# Patient Record
Sex: FEMALE | Race: WHITE | Employment: UNEMPLOYED | URBAN - METROPOLITAN AREA
[De-identification: names, ages, dates, MRNs, and addresses within clinical notes are randomized per-mention and may not be internally consistent; named-entity substitution may affect disease eponyms.]

---

## 2017-08-15 ENCOUNTER — HOSPITAL ENCOUNTER (EMERGENCY)
Age: 62
Discharge: HOME | End: 2017-08-15
Attending: EMERGENCY MEDICINE | Admitting: EMERGENCY MEDICINE
Payer: MEDICARE

## 2017-08-15 VITALS
SYSTOLIC BLOOD PRESSURE: 139 MMHG | WEIGHT: 169.6 LBS | OXYGEN SATURATION: 94 % | DIASTOLIC BLOOD PRESSURE: 89 MMHG | TEMPERATURE: 98.4 F | HEART RATE: 80 BPM | BODY MASS INDEX: 28.95 KG/M2 | HEIGHT: 64 IN | RESPIRATION RATE: 16 BRPM

## 2017-08-15 DIAGNOSIS — G89.29 OTHER CHRONIC PAIN: Primary | ICD-10-CM

## 2017-08-15 PROCEDURE — 96372 THER/PROPH/DIAG INJ SC/IM: CPT

## 2017-08-15 PROCEDURE — 99282 EMERGENCY DEPT VISIT SF MDM: CPT

## 2017-08-15 PROCEDURE — 74011250636 HC RX REV CODE- 250/636: Performed by: EMERGENCY MEDICINE

## 2017-08-15 RX ORDER — KETOROLAC TROMETHAMINE 30 MG/ML
60 INJECTION, SOLUTION INTRAMUSCULAR; INTRAVENOUS
Status: COMPLETED | OUTPATIENT
Start: 2017-08-15 | End: 2017-08-15

## 2017-08-15 RX ADMIN — KETOROLAC TROMETHAMINE 60 MG: 30 INJECTION, SOLUTION INTRAMUSCULAR at 10:08

## 2017-08-15 NOTE — ED TRIAGE NOTES
Triage: Pt c/o lower back pain for several years, pt states she has a compression fx recently discovered, pt has being seen by PCP who was also managing pain management regimen, PCP has recently cut all pain services.  Pt looking for pain specialist referral.

## 2017-08-15 NOTE — DISCHARGE INSTRUCTIONS
We hope that we have addressed all of your medical concerns. The examination and treatment you received in the Emergency Department were for an emergent problem and were not intended as complete care. It is important that you follow up with your healthcare provider(s) for ongoing care. If your symptoms worsen or do not improve as expected, and you are unable to reach your usual health care provider(s), you should return to the Emergency Department. Today's healthcare is undergoing tremendous change, and patient satisfaction surveys are one of the many tools to assess the quality of medical care. You may receive a survey from the [employer] regarding your experience in the Emergency Department. I hope that your experience has been completely positive, particularly the medical care that I provided. As such, please participate in the survey; anything less than excellent does not meet my expectations or intentions. [facility] and [address] [place] [address] participate in nationally recognized quality of care measures. If your blood pressure is greater than 120/80, as reported below, we urge that you seek medical care to address the potential of high blood pressure, commonly known as hypertension. Hypertension can be hereditary or can be caused by certain medical conditions, pain, stress, or \"white coat syndrome. \"       Please make an appointment with your health care provider(s) for follow up of your Emergency Department visit. VITALS:   Patient Vitals for the past 8 hrs:   Temp Pulse Resp BP SpO2   08/15/17 0938 98.4 °F (36.9 °C) 80 16 139/89 94 %          Thank you for allowing us to provide you with medical care today. We realize that you have many choices for your emergency care needs. Please choose us in the future for any continued health care needs.       Jannet Joyce NP    [facility].   Office: [phone]            No results found for this or any previous visit (from the past 24 hour(s)). No results found. Chronic Pain: Care Instructions  Your Care Instructions  Chronic pain is pain that lasts a long time (months or even years) and may or may not have a clear cause. It is different from acute pain, which usually does have a clear cause--like an injury or illness--and gets better over time. Chronic pain:  · Lasts over time but may vary from day to day. · Does not go away despite efforts to end it. · May disrupt your sleep and lead to fatigue. · May cause depression or anxiety. · May make your muscles tense, causing more pain. · Can disrupt your work, hobbies, home life, and relationships with friends and family. Chronic pain is a very real condition. It is not just in your head. Treatment can help and usually includes several methods used together, such as medicines, physical therapy, exercise, and other treatments. Learning how to relax and changing negative thought patterns can also help you cope. Chronic pain is complex. Taking an active role in your treatment will help you better manage your pain. Tell your doctor if you have trouble dealing with your pain. You may have to try several things before you find what works best for you. Follow-up care is a key part of your treatment and safety. Be sure to make and go to all appointments, and call your doctor if you are having problems. Its also a good idea to know your test results and keep a list of the medicines you take. How can you care for yourself at home? · Pace yourself. Break up large jobs into smaller tasks. Save harder tasks for days when you have less pain, or go back and forth between hard tasks and easier ones. Take rest breaks. · Relax, and reduce stress. Relaxation techniques such as deep breathing or meditation can help. · Keep moving. Gentle, daily exercise can help reduce pain over the long run.  Try low- or no-impact exercises such as walking, swimming, and stationary biking. Do stretches to stay flexible. · Try heat, cold packs, and massage. · Get enough sleep. Chronic pain can make you tired and drain your energy. Talk with your doctor if you have trouble sleeping because of pain. · Think positive. Your thoughts can affect your pain level. Do things that you enjoy to distract yourself when you have pain instead of focusing on the pain. See a movie, read a book, listen to music, or spend time with a friend. · If you think you are depressed, talk to your doctor about treatment. · Keep a daily pain diary. Record how your moods, thoughts, sleep patterns, activities, and medicine affect your pain. You may find that your pain is worse during or after certain activities or when you are feeling a certain emotion. Having a record of your pain can help you and your doctor find the best ways to treat your pain. · Take pain medicines exactly as directed. ¨ If the doctor gave you a prescription medicine for pain, take it as prescribed. ¨ If you are not taking a prescription pain medicine, ask your doctor if you can take an over-the-counter medicine. Reducing constipation caused by pain medicine  · Include fruits, vegetables, beans, and whole grains in your diet each day. These foods are high in fiber. · Drink plenty of fluids, enough so that your urine is light yellow or clear like water. If you have kidney, heart, or liver disease and have to limit fluids, talk with your doctor before you increase the amount of fluids you drink. · If your doctor recommends it, get more exercise. Walking is a good choice. Bit by bit, increase the amount you walk every day. Try for at least 30 minutes on most days of the week. · Schedule time each day for a bowel movement. A daily routine may help. Take your time and do not strain when having a bowel movement. When should you call for help?   Call your doctor now or seek immediate medical care if:  · Your pain gets worse or is out of control. · You feel down or blue, or you do not enjoy things like you once did. You may be depressed, which is common in people with chronic pain. Depression can be treated. · You have vomiting or cramps for more than 2 hours. Watch closely for changes in your health, and be sure to contact your doctor if:  · You cannot sleep because of pain. · You are very worried or anxious about your pain. · You have trouble taking your pain medicine. · You have any concerns about your pain medicine. · You have trouble with bowel movements, such as:  ¨ No bowel movement in 3 days. ¨ Blood in the anal area, in your stool, or on the toilet paper. ¨ Diarrhea for more than 24 hours. Where can you learn more? Go to http://timi-cristhian.info/. Enter N004 in the search box to learn more about \"Chronic Pain: Care Instructions. \"  Current as of: October 14, 2016  Content Version: 11.3  © 6025-5102 [employer]. Care instructions adapted under license by [employer] (which disclaims liability or warranty for this information). If you have questions about a medical condition or this instruction, always ask your healthcare professional. [place] [address] any warranty or liability for your use of this information. Learning About Prescribed Opioid Medicine for Chronic Pain  Introduction  Opioids are medicines used to relieve moderate to severe pain. They may be used for pain that may go on for a long time, such as for cancer or arthritis. Opioids relieve pain by changing the way your body feels pain and the way you feel about pain. They don't cure a health problem. But they do help you manage the pain. Your doctor will talk with you about how they fit into your overall treatment plan. Doctors follow a strict set of rules for giving opioids to their patients who have long-term (chronic) pain.  This is because these are powerful medicines. They can cause problems if they are not used correctly. They can also be misused. Getting a prescription for an opioid may seem hard to do. You may feel like your doctor doesn't trust you. Your doctor understands this. But the rules are the same for everyone. They help make sure that the medicine will be used safely. What happens before you get a prescription? Your doctor may talk with you about your pain history. He or she may ask about your family history. You may have a physical exam. You may also see a pain specialist. This helps your doctor decide if an opioid is right for you. Your doctor will also review your history of opioid use. Most states have a program that tracks prescriptions. If your state does, your doctor will check it to see if you've had prescriptions for opioids before. You may have a urine test to check for opioids in your system. If you're a woman, you may have a pregnancy test. Opioids are not safe to take if you are pregnant. When your doctor prescribes the medicine, he or she will discuss your treatment plan with you. That includes the risks and benefits of opioid medicines. Your doctor will tell you how much pain relief and improved ability to function you can expect. The goal is to take the lowest dose that improves your pain for the shortest amount of time. Your doctor will also talk with you about other things you can do to help relieve pain. Your doctor may suggest different medicines or other options, such as steroid injections, ice or heat, physical therapy, or ways to reduce stress. You and your doctor will talk about your responsibilities. You'll both sign a written agreement. You will agree to take your medicine exactly as your doctor tells you to. Ayesha Andrade also agree to be careful with it and not to share it with others. What happens after you start to use your medicine?   Regular follow-up visits to your doctor will help you and your doctor make sure that the medicine is the right treatment for your pain. At every visit, your doctor will check these things:  · Is your pain being controlled? · Are you better able to function and be active? · Are you having any side effects, like constipation, nausea, or being too sleepy? · Are the non-opioid pain control measures working? Are there other things you can try? · Are there any signs that you are misusing your medicine? While you are taking an opioid, you may have drug tests and prescription history checks from time to time. Follow-up care is a key part of your treatment and safety. Be sure to make and go to all appointments, and call your doctor if you are having problems. It's also a good idea to know your test results and keep a list of the medicines you take. Where can you learn more? Go to http://timi-cristhian.info/. Enter D374 in the search box to learn more about \"Learning About Prescribed Opioid Medicine for Chronic Pain. \"  Current as of: November 15, 2016  Content Version: 11.3  © 9031-2375 [employer]. Care instructions adapted under license by [employer] (which disclaims liability or warranty for this information). If you have questions about a medical condition or this instruction, always ask your healthcare professional. [address] any warranty or liability for your use of this information. Developing a Pain Management Plan: Care Instructions  Your Care Instructions  Some diseases and injuries can cause pain that lasts a long time. You don't need to live with uncontrolled pain. A pain management plan helps you find ways to control pain with side effects you can live with. There are things you can do to help with pain. Only you know how much pain you feel. Constant pain can make you depressed. It can cause stress and make it hard for you to eat and sleep. But there are ways to control the pain.  They can help you stay active, improve your mood, and heal faster. Your plan can include many types of pain control. You may take prescription or over-the-counter drugs. You can also try physical treatments and behavioral methods. Some medical treatments also help with pain. For example, radiation can be used to reduce pain from bone cancer. You and your doctor will work to make your plan. Be sure to read it often. Change it if your pain is not under control. Follow-up care is a key part of your treatment and safety. Be sure to make and go to all appointments, and call your doctor if you are having problems. It's also a good idea to know your test results and keep a list of the medicines you take. How can you care for yourself at home? Physical treatments  · Be safe with medicines. Take pain medicines exactly as directed. ¨ If the doctor gave you a prescription medicine for pain, take it exactly as prescribed. ¨ If you are not taking a prescription pain medicine, ask your doctor if you can take an over-the-counter medicine. · If your pain medicine causes side effects such as constipation or nausea, you may need to take other medicines for those problems. Talk to your doctor about any side effects you have. · Hot or cold compresses applied directly to the skin can help sore muscles. Put ice or a cold pack on the painful area for 10 to 20 minutes at a time. Put a thin cloth between the ice and your skin. You may find that it helps to switch between cold and heat. Try a heating pad set on low or a warm cloth on the area for 10 to 15 minutes at a time. · Hydrotherapy uses flowing water to relax muscles. You may want to sit in a hot tub or a steam bath. Or use a shower or a sitz bath to help your pain. · Massage therapy is rubbing the soft tissues of the body. It eases tension and pain. It also improves blood flow and helps you relax.   · Transcutaneous electrical nerve stimulation (TENS) uses a gentle electric current applied to the skin for pain relief. You can use TENS at home after you learn how. · Acupuncture is a form of traditional [facility] medicine. It uses very thin needles inserted into certain points of the body. It is done by a person with special training (acupuncturist). · If you get physical therapy, make sure to do any home exercises or stretching your therapist has prescribed. · Stay as active as you can. Try to get some physical activity every day. Behavioral treatments  · Biofeedback teaches you to control a body function that you normally don't think about. These are things like skin temperature, heart rate, and blood pressure. At first, you will use a machine to give you feedback on the function you want to control. Then a therapist will teach you what to do next. Over time, you can stop using the machine. · Breathing techniques can help you relax and get rid of tension. · Guided imagery is a series of thoughts and images that can focus your attention away from your pain. When you do it, you use all your senses to help your body respond as though what you are imagining is real.  · Hypnosis is a state of focused concentration that makes you less aware of your surroundings. It may cause your brain to release chemicals that relieve pain. You can have a therapist help you through hypnosis. Or you can learn to use it on yourself. · Cognitive behavioral therapy is a type of counseling. It helps you change your thought patterns. Changes in your thoughts can help change your behavior and the way you perceive your body. Other treatments and ideas  · Aromatherapy uses the scent of oils obtained from plants to help you relax or to relieve stress. · Meditation focuses your attention to give a clear awareness of your life. You sit quietly, focus on one image or sound, and breathe deeply. · Yoga uses stretching and exercises (called postures) to reduce stress and improve flexibility and health.   · Keep track of your pain in a pain diary. This can help you understand how the things you do affect your pain. · In rare cases, your doctor may advise you to get a nerve block to help with pain. A nerve block is a shot of medicine to interrupt pain signals to your brain. · Some hospitals have special pain clinics or centers. Your doctor may refer you to a pain clinic or center. Or you can ask your doctor about them. Where can you learn more? Go to http://timi-cristhian.info/. Enter H796 in the search box to learn more about \"Developing a Pain Management Plan: Care Instructions. \"  Current as of: October 14, 2016  Content Version: 11.3  © 4079-4762 [employer] [facility]. Care instructions adapted under license by [employer] (which disclaims liability or warranty for this information). If you have questions about a medical condition or this instruction, always ask your healthcare professional. [address] any warranty or liability for your use of this information.

## 2017-08-15 NOTE — ED PROVIDER NOTES
Patient is a 57 y.o. female presenting with back pain. Back Pain    Pertinent negatives include no headaches, no abdominal pain and no dysuria. Pt states that she suffers from chronic pain and was released from the care of her pain management specialist (Dr. Kenisha Ewing) 1 month ago. She states that she took her last pain pill yesterday and is requesting pain medication. She states that she suffers from chronic back pain secondary to multiple old compression fractures. Fibromyalgia and arthritis. She denies any new injuries, falls or direct trauma. Denies fever, cold symptoms, headache, neck pain, visual changes, focal weakness, unexplained weight changes or rash. Denies any difficulty breathing, difficulty swallowing, SOB or chest pain. Denies any nausea, vomiting or diarrhea. Pt. Reports that she has a new PCP Dr. Hugh Wynn but has not asked her to manage her pain. Old charts reviewed. Past Medical History:   Diagnosis Date    Arthritis     Bulging disc     COPD     Fibromyalgia     GERD (gastroesophageal reflux disease)     Hypercholesteremia     Hypothyroidism     Osteopenia     Other ill-defined conditions     fibromyalgia  osteopenia    Psychiatric disorder     Thyroid disease     hypothyroidism    Unspecified sleep apnea        Past Surgical History:   Procedure Laterality Date    HX GYN           History reviewed. No pertinent family history. Social History     Social History    Marital status:      Spouse name: N/A    Number of children: N/A    Years of education: N/A     Occupational History    Not on file.      Social History Main Topics    Smoking status: Current Every Day Smoker     Packs/day: 1.50     Years: 42.00    Smokeless tobacco: Never Used    Alcohol use No    Drug use: Not on file    Sexual activity: Not on file     Other Topics Concern    Not on file     Social History Narrative         ALLERGIES: Review of patient's allergies indicates no known allergies. Review of Systems   Constitutional: Negative for activity change and appetite change. HENT: Negative for facial swelling, sore throat and trouble swallowing. Eyes: Negative. Respiratory: Negative for shortness of breath. Cardiovascular: Negative. Gastrointestinal: Negative for abdominal pain, diarrhea and vomiting. Genitourinary: Negative for dysuria. Musculoskeletal: Positive for back pain. Negative for neck pain. Skin: Negative for color change. Neurological: Negative for headaches. Psychiatric/Behavioral: Negative. Vitals:    08/15/17 0938   BP: 139/89   Pulse: 80   Resp: 16   Temp: 98.4 °F (36.9 °C)   SpO2: 94%   Weight: 76.9 kg (169 lb 9.6 oz)   Height: 5' 4\" (1.626 m)            Physical Exam   Constitutional: She is oriented to person, place, and time. She appears well-nourished. Elderly white female; smoker   HENT:   Head: Normocephalic. Right Ear: External ear normal.   Left Ear: External ear normal.   Mouth/Throat: Oropharynx is clear and moist.   Eyes: Pupils are equal, round, and reactive to light. Neck: Normal range of motion. Neck supple. Cardiovascular: Normal rate and regular rhythm. Pulmonary/Chest: Effort normal and breath sounds normal.   Abdominal: Soft. Bowel sounds are normal.   Musculoskeletal: Normal range of motion. She exhibits tenderness. She exhibits no deformity. Reports chronic lower back pain; Denies any blunt trauma or new  injury. Denies fever,rash, abdominal pain or urinary symptoms. Denies any saddle anesthesia, changes in bowel or bladder habits. Pain increases with bending, lifting and position changes. Gait is slow but steady with use of her walker; reflexes are normal.     Neurological: She is alert and oriented to person, place, and time. Skin: Skin is warm and dry. Nursing note and vitals reviewed.        MDM  ED Course       Procedures    Discussed with the pt that the ED does not treat chronic pain; referrals to pain managements clinics were given. 10:07 AM  Patient's results and plan of care have been reviewed with her. Patient has verbally conveyed her understanding and agreement of her signs, symptoms, diagnosis, treatment and prognosis and additionally agrees to follow up as recommended or return to the Emergency Room should her condition change prior to follow-up. Discharge instructions have also been provided to the patient with some educational information regarding her diagnosis as well a list of reasons why she  would want to return to the ER prior to her follow-up appointment should her condition change. Mckenna Molina NP  Discussed plan of care with Dr. Claudette Patrick.  Mckenna Molina NP

## 2017-09-25 ENCOUNTER — OFFICE VISIT (OUTPATIENT)
Dept: NEUROLOGY | Age: 62
End: 2017-09-25

## 2017-09-25 ENCOUNTER — HOSPITAL ENCOUNTER (OUTPATIENT)
Dept: LAB | Age: 62
Discharge: HOME | End: 2017-09-25

## 2017-09-25 VITALS
SYSTOLIC BLOOD PRESSURE: 134 MMHG | OXYGEN SATURATION: 97 % | DIASTOLIC BLOOD PRESSURE: 91 MMHG | BODY MASS INDEX: 28.38 KG/M2 | HEART RATE: 84 BPM | RESPIRATION RATE: 18 BRPM | HEIGHT: 64 IN | WEIGHT: 166.2 LBS | TEMPERATURE: 98.8 F

## 2017-09-25 DIAGNOSIS — G62.9 POLYNEUROPATHY: Primary | ICD-10-CM

## 2017-09-25 DIAGNOSIS — R26.9 GAIT DISORDER: ICD-10-CM

## 2017-09-25 DIAGNOSIS — G43.009 MIGRAINE WITHOUT AURA AND WITHOUT STATUS MIGRAINOSUS, NOT INTRACTABLE: ICD-10-CM

## 2017-09-25 DIAGNOSIS — R43.0 ANOSMIA: ICD-10-CM

## 2017-09-25 DIAGNOSIS — R20.2 PARESTHESIA: ICD-10-CM

## 2017-09-25 DIAGNOSIS — R29.6 FREQUENT FALLS: ICD-10-CM

## 2017-09-25 DIAGNOSIS — M79.18 MYOFASCIAL MUSCLE PAIN: ICD-10-CM

## 2017-09-25 DIAGNOSIS — R42 DIZZINESS: ICD-10-CM

## 2017-09-25 DIAGNOSIS — G31.84 MCI (MILD COGNITIVE IMPAIRMENT): ICD-10-CM

## 2017-09-25 PROCEDURE — 99001 SPECIMEN HANDLING PT-LAB: CPT | Performed by: PSYCHIATRY & NEUROLOGY

## 2017-09-25 RX ORDER — GABAPENTIN 100 MG/1
CAPSULE ORAL
Qty: 270 CAP | Refills: 1 | Status: SHIPPED | OUTPATIENT
Start: 2017-09-25 | End: 2017-10-23 | Stop reason: DRUGHIGH

## 2017-09-25 RX ORDER — PREDNISONE 20 MG/1
TABLET ORAL AS NEEDED
COMMUNITY
End: 2017-12-26 | Stop reason: SDUPTHER

## 2017-09-25 RX ORDER — SUMATRIPTAN 100 MG/1
TABLET, FILM COATED ORAL
Qty: 12 TAB | Refills: 3 | Status: SHIPPED | OUTPATIENT
Start: 2017-09-25 | End: 2021-10-24

## 2017-09-25 RX ORDER — BACLOFEN 10 MG/1
TABLET ORAL
Qty: 180 TAB | Refills: 1 | Status: SHIPPED | OUTPATIENT
Start: 2017-09-25 | End: 2018-03-15 | Stop reason: SDUPTHER

## 2017-09-25 RX ORDER — ERGOCALCIFEROL 1.25 MG/1
CAPSULE ORAL
Refills: 3 | COMMUNITY
Start: 2017-07-26 | End: 2021-10-24

## 2017-09-25 RX ORDER — QUETIAPINE 150 MG/1
TABLET, EXTENDED RELEASE ORAL
Refills: 2 | COMMUNITY
Start: 2017-09-11 | End: 2017-09-25 | Stop reason: SDUPTHER

## 2017-09-25 RX ORDER — GABAPENTIN 100 MG/1
100 CAPSULE ORAL 3 TIMES DAILY
Qty: 90 CAP | Refills: 1 | Status: SHIPPED | OUTPATIENT
Start: 2017-09-25 | End: 2017-09-25 | Stop reason: SDUPTHER

## 2017-09-25 RX ORDER — OXYCODONE AND ACETAMINOPHEN 5; 325 MG/1; MG/1
TABLET ORAL
Refills: 0 | COMMUNITY
Start: 2017-07-06 | End: 2017-10-23 | Stop reason: SDUPTHER

## 2017-09-25 RX ORDER — LEVOTHYROXINE SODIUM 88 UG/1
88 TABLET ORAL
Refills: 2 | COMMUNITY
Start: 2017-09-05 | End: 2020-06-13

## 2017-09-25 RX ORDER — BACLOFEN 10 MG/1
10 TABLET ORAL 2 TIMES DAILY
Qty: 60 TAB | Refills: 1 | Status: SHIPPED | OUTPATIENT
Start: 2017-09-25 | End: 2017-09-25 | Stop reason: SDUPTHER

## 2017-09-25 RX ORDER — BUPROPION HYDROCHLORIDE 300 MG/1
TABLET ORAL
Refills: 2 | COMMUNITY
Start: 2017-09-01 | End: 2021-05-28 | Stop reason: SDUPTHER

## 2017-09-25 RX ORDER — LISINOPRIL 10 MG/1
10 TABLET ORAL DAILY
Refills: 5 | COMMUNITY
Start: 2017-09-21 | End: 2021-05-28 | Stop reason: SDUPTHER

## 2017-09-25 RX ORDER — QUETIAPINE FUMARATE 100 MG/1
100 TABLET, FILM COATED ORAL
COMMUNITY
End: 2020-05-14 | Stop reason: SDUPTHER

## 2017-09-25 NOTE — MR AVS SNAPSHOT
Visit Information Date & Time Provider Department Dept. Phone Encounter #  
 9/25/2017  2:00 PM Louis Gee MD [facility] at [facility] [phone] [id number] Follow-up Instructions Return in about 4 weeks (around 10/23/2017). Upcoming Health Maintenance Date Due Hepatitis C Screening 1955 Pneumococcal 19-64 Medium Risk (1 of 1 - PPSV23) 10/28/1973 DTaP/Tdap/Td series (1 - Tdap) 10/28/1975 PAP AKA CERVICAL CYTOLOGY 10/28/1975 BREAST CANCER SCRN MAMMOGRAM 10/28/2004 FOBT Q 1 YEAR AGE 50-75 10/28/2004 ZOSTER VACCINE AGE 60> 8/28/2014 INFLUENZA AGE 9 TO ADULT 8/1/2017 Allergies as of 9/25/2017  Review Complete On: 9/25/2017 By: Sophie Armstrong MD  
 No Known Allergies Current Immunizations  Never Reviewed No immunizations on file. Not reviewed this visit You Were Diagnosed With   
  
 Codes Comments Polyneuropathy ([facility] [address]    -  Primary ICD-10-CM: G62.9 ICD-9-CM: 356.9 Paresthesia     ICD-10-CM: R20.2 ICD-9-CM: 782.0 Anosmia     ICD-10-CM: R43.0 ICD-9-CM: 781.1 Gait disorder     ICD-10-CM: R26.9 ICD-9-CM: 781.2 Dizziness     ICD-10-CM: H09 ICD-9-CM: 780.4 Migraine without aura and without status migrainosus, not intractable     ICD-10-CM: Z07.819 ICD-9-CM: 346.10 Frequent falls     ICD-10-CM: R29.6 ICD-9-CM: V15.88 Myofascial muscle pain     ICD-10-CM: M79.1 ICD-9-CM: 729.1 Vitals BP Pulse Temp Resp Height(growth percentile) Weight(growth percentile) (!) 134/91 (BP 1 Location: Left arm, BP Patient Position: Sitting) 84 98.8 °F (37.1 °C) (Oral) 18 5' 4\" (1.626 m) 166 lb 3.2 oz (75.4 kg) SpO2 BMI OB Status Smoking Status 97% 28.53 kg/m2 Postmenopausal Current Every Day Smoker BMI and BSA Data Body Mass Index Body Surface Area 28.53 kg/m 2 1.85 m 2 Preferred Pharmacy Pharmacy Name Phone [address], [address]  [phone] Your Updated Medication List  
  
   
This list is accurate as of: 9/25/17  3:05 PM.  Always use your most recent med list.  
  
  
  
  
 albuterol 90 mcg/actuation inhaler Commonly known as:  PROVENTIL HFA, VENTOLIN HFA, PROAIR HFA Take 2-3 Puffs by inhalation every six (6) hours as needed. albuterol-ipratropium 2.5 mg-0.5 mg/3 ml Nebu Commonly known as:  DUO-NEB  
3 mL by Nebulization route every four (4) hours. ATROVENT HFA 17 mcg/actuation inhaler Generic drug:  ipratropium Take 2-3 Puffs by inhalation every six (6) hours as needed. baclofen 10 mg tablet Commonly known as:  LIORESAL Take 1 Tab by mouth two (2) times a day. BC HEADACHE POWDER PO Take  by mouth as needed. BONIVA 150 mg tablet Generic drug:  ibandronate Take 150 mg by mouth every thirty (30) days. buPROPion  mg XL tablet Commonly known as:  WELLBUTRIN XL  
TK 1 T PO D  
  
 CALCIUM WITH VITAMIN D tablet Generic drug:  calcium-cholecalciferol (D3) Take 1 Tab by mouth two (2) times a day. diazePAM 5 mg tablet Commonly known as:  VALIUM Take 5 mg by mouth daily as needed. ergocalciferol 50,000 unit capsule Commonly known as:  ERGOCALCIFEROL TK 1 C PO TWICE A WEEK  AS DIRECTED  
  
 FLUVIRIN 2494-5334 Susp injection Generic drug:  influenza vaccine 2017-18 (4 yrs+) ADM 0.5ML IM UTD  
  
 gabapentin 100 mg capsule Commonly known as:  NEURONTIN Take 1 Cap by mouth three (3) times daily. ibuprofen 400 mg tablet Commonly known as:  MOTRIN Take 1 Tab by mouth every eight (8) hours as needed for Pain. ICY HOT EX  
by Apply Externally route daily. levothyroxine 88 mcg tablet Commonly known as:  SYNTHROID TK 1 T PO QD  
  
 LIDODERM 5 % Generic drug:  lidocaine 1 Patch by TransDERmal route every twelve (12) hours. lisinopril 10 mg tablet Commonly known as:  Arnold Files TK 1 T PO QD  
  
 OTHER(NON-FORMULARY) Take 80 mg by mouth as needed. \"Asacolec\"   Indications: CONSTIPATION  
  
 * oxyCODONE-acetaminophen  mg per tablet Commonly known as:  PERCOCET 10 Take 1 Tab by mouth every four (4) hours as needed for Pain. * oxyCODONE-acetaminophen 5-325 mg per tablet Commonly known as:  PERCOCET TK 1 T PO TID PRN  
  
 predniSONE 20 mg tablet Commonly known as:  Thong Gelineau Take  by mouth as needed. QUEtiapine 100 mg tablet Commonly known as:  SEROquel Take 100 mg by mouth nightly. simvastatin 40 mg tablet Commonly known as:  ZOCOR Take 40 mg by mouth nightly. SPIRIVA WITH HANDIHALER 18 mcg inhalation capsule Generic drug:  tiotropium Take 1 Cap by inhalation daily. Indications: CHRONIC OBSTRUCTIVE PULMONARY DISEASE WITH BRONCHOSPASMS  
  
 SUMAtriptan 100 mg tablet Commonly known as:  IMITREX  
1 tab at onset of moderate-severe migraine; may repeat 1 tab in 2 hours; Limit: 2 tabs in 24/ hrs, not more than 3 days a week  
  
 venlafaxine 75 mg tablet Commonly known as:  [place] [facility] Take 25 mg by mouth two (2) times a day. * Notice: This list has 2 medication(s) that are the same as other medications prescribed for you. Read the directions carefully, and ask your doctor or other care provider to review them with you. Prescriptions Sent to Pharmacy Refills  
 gabapentin (NEURONTIN) 100 mg capsule 1 Sig: Take 1 Cap by mouth three (3) times daily. Class: Normal  
 Pharmacy: [facility], [address] [place] [address] #: [phone] Route: Oral  
 baclofen (LIORESAL) 10 mg tablet 1 Sig: Take 1 Tab by mouth two (2) times a day.   
 Class: Normal  
 Pharmacy: [facility] [address] [place] [address] Ph #: [phone] Route: Oral  
 SUMAtriptan (IMITREX) 100 mg tablet 3 Si tab at onset of moderate-severe migraine; may repeat 1 tab in 2 hours; Limit: 2 tabs in 24/ hrs, not more than 3 days a week Class: Normal  
 Pharmacy: [facility] [address] [place] [address] Ph #: [phone] We Performed the Following MOE, DIRECT, W/REFLEX X397119 CPT(R)] ANGIOTENSIN CONVERTING ENZYME N1087628 CPT(R)] CK F1400040 CPT(R)] CRP, HIGH SENSITIVITY [05379 CPT(R)] REFERRAL TO PHYSICAL THERAPY [PUW16 Custom] RHEUMATOID FACTOR, QL O8624099 CPT(R)] SED RATE (ESR) X2575765 CPT(R)] TSH 3RD GENERATION [10972 CPT(R)] VITAMIN B12 M3830246 CPT(R)] Follow-up Instructions Return in about 4 weeks (around 10/23/2017). To-Do List   
 2017 Neurology:  EEG   
  
 2017 Imaging:  MRI BRAIN W WO CONT Referral Information Referral ID Referred By Referred To  
  
 [id number] Marty DIAZ Not Available Visits Status Start Date End Date 1 New Request 17 If your referral has a status of pending review or denied, additional information will be sent to support the outcome of this decision. Introducing [facility]! Samira Lora introduces [employer] patient portal. Now you can access parts of your medical record, email your doctor's office, and request medication refills online. 1. In your internet browser, go to [email or web address] 2. Click on the First Time User? Click Here link in the Sign In box. You will see the New Member Sign Up page. 3. Enter your [employer] Access Code exactly as it appears below. You will not need to use this code after youve completed the sign-up process.  If you do not sign up before the expiration date, you must request a new code. · [employer] Access Code: [email or web address] Expires: 11/13/2017 10:10 AM 
 
4. Enter the last four digits of your Social Security Number (xxxx) and Date of Birth (mm/dd/yyyy) as indicated and click Submit. You will be taken to the next sign-up page. 5. Create a [employer] ID. This will be your [employer] login ID and cannot be changed, so think of one that is secure and easy to remember. 6. Create a [employer] password. You can change your password at any time. 7. Enter your Password Reset Question and Answer. This can be used at a later time if you forget your password. 8. Enter your e-mail address. You will receive e-mail notification when new information is available in [address] 9. Click Sign Up. You can now view and download portions of your medical record. 10. Click the Download Summary menu link to download a portable copy of your medical information. If you have questions, please visit the Frequently Asked Questions section of the [employer] website. Remember, [employer] is NOT to be used for urgent needs. For medical emergencies, dial 911. Now available from your iPhone and Android! Please provide this summary of care documentation to your next provider. Your primary care clinician is listed as Virgil Figueroa. If you have any questions after today's visit, please call [phone].

## 2017-09-25 NOTE — PROGRESS NOTES
Neurology Consult Note      HISTORY PROVIDED BY: patient    Chief Complaint:   Chief Complaint   Patient presents with    Neurologic Problem    New Patient      Subjective:    Toña Cleaning is a 57 y.o. right handed white female who presents in consultation for frequent and difficuty walking. Patient said that her off balance started about a year and half ago and it has been increasing since then making her to have frequent falls,She said she has be using walker most of the time and at home she uses hover around. Says the symptoms started gradually,she had apparently spontaneous herniated disk of the back in the year 2005 for which  she had surgery, and subsequently she started having a lot of pains and was diagnosed with Fibromyalgia,she also developed other symptoms. She started later on being off balance and falling, her sense of smell changed. On further questioning, she said that her sense of smell decreased, she is always tired and her memory is getting worse. Says she has frequent headache for which she calls sinus headache and uses either sinus medication or BC powder for the headache, says she has remote history of migraine headache. Says she experiences blurry vision. Admits occasional odynophagia, neck pain, dizziness, back pain, joint pain, muscle pain,nausea, vomiting,constipation, diarrhea numbness and tingling sensation,. Denies LOC, chest pain, odynophagia,  hematuria, hematochezia, dysuria.     Past Medical History:   Diagnosis Date    Anxiety disorder     Arthritis     Bulging disc     Constipation     COPD     Cough     Depression     Diarrhea     Falls     Fatigue     Fibromyalgia     Frequent headaches     GERD (gastroesophageal reflux disease)     Hearing loss     Hypercholesteremia     Hypothyroidism     Incontinence     Joint pain     Leg swelling     Memory disorder     Muscle pain     Muscle weakness     Nausea and vomiting     Neuropathy (HCC)     Osteopenia     Other ill-defined conditions     fibromyalgia  osteopenia    Psychiatric disorder     Snoring     SOB (shortness of breath)     Stomach pain     Swallowing difficulty     Thyroid disease     hypothyroidism    Unspecified sleep apnea     Visual disturbance       Past Surgical History:   Procedure Laterality Date    HX GYN        Social History     Social History    Marital status: UNKNOWN     Spouse name: N/A    Number of children: N/A    Years of education: N/A     Occupational History    Not on file. Social History Main Topics    Smoking status: Current Every Day Smoker     Packs/day: 1.50     Years: 42.00    Smokeless tobacco: Never Used    Alcohol use No    Drug use: Not on file    Sexual activity: Not on file     Other Topics Concern    Not on file     Social History Narrative     Family History   Problem Relation Age of Onset    Cancer Paternal Grandmother      breast    Emphysema Paternal Grandmother          Objective:   ROS  Review of Systems - Negative except per HPI  No Known Allergies     Meds:  Outpatient Medications Prior to Visit   Medication Sig Dispense Refill    ibuprofen (MOTRIN) 400 mg tablet Take 1 Tab by mouth every eight (8) hours as needed for Pain. (Patient taking differently: Take 800 mg by mouth every eight (8) hours as needed for Pain.) 30 Tab 0    albuterol (PROVENTIL HFA, VENTOLIN HFA) 90 mcg/Actuation inhaler Take 2-3 Puffs by inhalation every six (6) hours as needed.  ipratropium (ATROVENT HFA) 17 mcg/Actuation inhaler Take 2-3 Puffs by inhalation every six (6) hours as needed.  albuterol-ipratropium (DUO-NEB) 0.5 mg-3 mg(2.5 mg base)/3 mL nebulizer solution 3 mL by Nebulization route every four (4) hours.  simvastatin (ZOCOR) 40 mg tablet Take 40 mg by mouth nightly.  diazepam (VALIUM) 5 mg tablet Take 5 mg by mouth daily as needed.       oxyCODONE-acetaminophen (PERCOCET 10)  mg per tablet Take 1 Tab by mouth every four (4) hours as needed for Pain. 60 Tab 0    venlafaxine (EFFEXOR) 75 mg tablet Take 25 mg by mouth two (2) times a day.  tiotropium (SPIRIVA WITH HANDIHALER) 18 mcg inhalation capsule Take 1 Cap by inhalation daily. Indications: CHRONIC OBSTRUCTIVE PULMONARY DISEASE WITH BRONCHOSPASMS      ibandronate (BONIVA) 150 mg tablet Take 150 mg by mouth every thirty (30) days.  lidocaine (LIDODERM) 5 %(700 mg/patch) 1 Patch by TransDERmal route every twelve (12) hours.  OTHER,NON-FORMULARY, Take 80 mg by mouth as needed. \"Asacolec\"   Indications: CONSTIPATION      calcium-cholecalciferol, D3, (CALCIUM WITH VITAMIN D) tablet Take 1 Tab by mouth two (2) times a day.  levothyroxine (SYNTHROID) 125 mcg tablet Take 125 mcg by mouth Daily (before breakfast). Indications: HYPOTHYROIDISM      cyclobenzaprine (FLEXERIL) 10 mg tablet Take 10 mg by mouth two (2) times daily as needed.  quetiapine (SEROQUEL) 25 mg tablet Take 25 mg by mouth nightly as needed. No facility-administered medications prior to visit. Imaging:  MRI Results (most recent):  No results found for this or any previous visit. CT Results (most recent):       Reviewed records in [facility] [facility] [employer] tab today    Lab Review   Results for orders placed or performed during the hospital encounter of 83/03/98   METABOLIC PANEL, COMPREHENSIVE   Result Value Ref Range    Sodium 142 136 - 145 MMOL/L    Potassium 3.4 (L) 3.5 - 5.1 MMOL/L    Chloride 110 (H) 97 - 108 MMOL/L    CO2 20 (L) 21 - 32 MMOL/L    Anion gap 12 5 - 15 mmol/L    Glucose 171 (H) 65 - 100 MG/DL    BUN 10 6 - 20 MG/DL    Creatinine 0.6 0.6 - 1.3 MG/DL    BUN/Creatinine ratio 17 12 - 20      GFR est AA >60 >60 ml/min/1.73m2    GFR est non-AA >60 >60 ml/min/1.73m2    Calcium 8.5 8.5 - 10.1 MG/DL    Bilirubin, total 0.3 0.2 - 1.0 MG/DL    ALT (SGPT) 29 12 - 78 U/L    AST (SGOT) 29 15 - 37 U/L    Alk.  phosphatase 71 50 - 136 U/L    Protein, total 7.1 6.4 - 8.2 g/dL    Albumin 3.6 3.5 - 5.0 g/dL    Globulin 3.5 2.0 - 4.0 g/dL    A-G Ratio 1.0 (L) 1.1 - 2.2     CBC WITH AUTOMATED DIFF   Result Value Ref Range    WBC 6.5 3.6 - 11.0 K/uL    RBC 4.83 3.80 - 5.20 M/uL    HGB 14.5 11.5 - 16.0 g/dL    HCT 42.0 35.0 - 47.0 %    MCV 87.0 80.0 - 99.0 FL    MCH 30.0 26.0 - 34.0 PG    MCHC 34.5 30.0 - 36.5 g/dL    RDW 12.9 11.5 - 14.5 %    PLATELET 699 442 - 870 K/uL    NEUTROPHILS 54 32 - 75 %    LYMPHOCYTES 34 12 - 49 %    MONOCYTES 7 5 - 13 %    EOSINOPHILS 4 0 - 7 %    BASOPHILS 1 0 - 1 %    ABS. NEUTROPHILS 3.4 1.8 - 8.0 K/UL    ABS. LYMPHOCYTES 2.2 0.8 - 3.5 K/UL    ABS. MONOCYTES 0.5 0.0 - 1.0 K/UL    ABS. EOSINOPHILS 0.3 0.0 - 0.4 K/UL    ABS.  BASOPHILS 0.1 0.0 - 0.1 K/UL    DF MANUAL     RBC COMMENTS NORMOCYTIC, NORMOCHROMIC    URINALYSIS W/MICROSCOPIC   Result Value Ref Range    Color YELLOW     Appearance CLEAR     Specific gravity 1.008 1.003 - 1.030      pH (UA) 5.5 5.0 - 8.0      Protein NEGATIVE  NEGATIVE MG/DL    Glucose NEGATIVE  NEGATIVE MG/DL    Ketone NEGATIVE  NEGATIVE MG/DL    Bilirubin NEGATIVE  NEGATIVE    Blood SMALL (A) NEGATIVE    Urobilinogen 0.2 0.2 - 1.0 EU/DL    Nitrites NEGATIVE  NEGATIVE    Leukocyte Esterase NEGATIVE  NEGATIVE    WBC 0-4 0 - 4 /HPF    RBC 0-3 0 - 5 /HPF    Epithelial cells 0-5 0 - 5 /LPF    Bacteria NEGATIVE  NEGATIVE /HPF    Hyaline cast 0-2 0 - 2   EKG, 12 LEAD, INITIAL   Result Value Ref Range    Ventricular Rate 79 BPM    Atrial Rate 79 BPM    P-R Interval 132 ms    QRS Duration 76 ms    Q-T Interval 410 ms    QTC Calculation (Bezet) 470 ms    Calculated P Axis 52 degrees    Calculated R Axis 36 degrees    Calculated T Axis 22 degrees    Diagnosis       Normal sinus rhythm  Normal ECG  When compared with ECG of 21-JUN-2010 14:45,  No significant change was found  Confirmed by Elba Wing M.D., [place] ([id number]) on 4/14/2012 10:18:15 AM        Exam:  Visit Vitals    BP (!) 134/91 (BP 1 Location: Left arm, BP Patient Position: Sitting)    Pulse 84    Temp 98.8 °F (37.1 °C) (Oral)    Resp 18    Ht 5' 4\" (1.626 m)    Wt 166 lb 3.2 oz (75.4 kg)    SpO2 97%    BMI 28.53 kg/m2     General:  Alert, cooperative, no distress. Head:  Normocephalic, without obvious abnormality, atraumatic. Respiratory:  Heart:   Non labored breathing  Regular rate and rhythm, no murmurs   Neck:   2+ carotids, no bruits   Extremities: Warm, no cyanosis or edema. Pulses: 2+ radial pulses. Neurologic:  MS: Alert and oriented x 4, speech intact. Language intact, able to name, repeat, and follow all commands. Attention and fund of knowledge appropriate. Recent and remote memory intact. Cranial Nerves:  II: visual fields Full to confrontation   II: pupils Equal, round, reactive to light   II: optic disc No papilledema   III,VII: ptosis none   III,IV,VI: extraocular muscles  EOMI, no nystagmus or diplopia   V: facial light touch sensation  normal   VII: facial muscle function   symmetric   VIII: hearing intact   IX: soft palate elevation  normal   XI: trapezius strength  5/5   XI: sternocleidomastoid strength 5/5   XII: tongue  Midline     Motor: normal bulk and tone, mild tremor              Strength: 5/5 throughout, no PD  Sensory: Decreased sensation  to LT, PP  Coordination:Unable to FTN and HTS , EASTON abnormal,Romberg abnormal  Gait: Unsteady  gait, Unable to heel, toe, and tandem walk, ambulates with walker,  Reflexes: 2+ symmetric, toes  mute           Assessment/Plan       ICD-10-CM ICD-9-CM    1. Polyneuropathy (HCC) G62.9 356.9 VITAMIN B12      CRP, HIGH SENSITIVITY      RHEUMATOID FACTOR, QL      ANGIOTENSIN CONVERTING ENZYME      CK   2. Paresthesia R20.2 782.0 MRI BRAIN W WO CONT      SED RATE (ESR)      VITAMIN B12      CRP, HIGH SENSITIVITY      TSH 3RD GENERATION      RHEUMATOID FACTOR, QL      EEG      ANGIOTENSIN CONVERTING ENZYME      CK      MOE, DIRECT, W/REFLEX   3. Anosmia R43.0 781.1 MRI BRAIN W WO CONT   4.  Gait disorder R26.9 781.2 MRI BRAIN W WO CONT TSH 3RD GENERATION      RHEUMATOID FACTOR, QL      ANGIOTENSIN CONVERTING ENZYME      REFERRAL TO PHYSICAL THERAPY      CK      MOE, DIRECT, W/REFLEX   5. Dizziness R42 780.4 MRI BRAIN W WO CONT      SED RATE (ESR)      VITAMIN B12      CRP, HIGH SENSITIVITY      RHEUMATOID FACTOR, QL      EEG      REFERRAL TO PHYSICAL THERAPY      MOE, DIRECT, W/REFLEX   6. Migraine without aura and without status migrainosus, not intractable G43.009 346.10 MRI BRAIN W WO CONT      SED RATE (ESR)   7. Frequent falls R29.6 V15.88 MRI BRAIN W WO CONT      REFERRAL TO PHYSICAL THERAPY   8. Myofascial muscle pain M79.1 729.1    9. MCI (mild cognitive impairment) G31.84 331.83      Plan:  Neurontin 100mg p.o. Tid  Imitrex 100mg p. o.prn  MRI Brain  Blood for autoimmune work up, neuropathy,  EEG  Physical therapyAdvised on the dangers of tobacco abuse  Advised on the benefits of discontinuation   Advised on available treatments. 10 minutes spent on counseling. Follow-up Disposition:  Return in about 4 weeks (around 10/23/2017).       Signed:  Edelmira Goldstein MD  9/26/2017

## 2017-09-28 LAB
ACE SERPL-CCNC: < 15 U/L (ref 14–82)
ANA SER QL: NEGATIVE
CK SERPL-CCNC: 80 U/L (ref 24–173)
CRP SERPL HS-MCNC: 1.63 MG/L (ref 0–3)
ERYTHROCYTE [SEDIMENTATION RATE] IN BLOOD BY WESTERGREN METHOD: 3 MM/HR (ref 0–40)
RHEUMATOID FACT SERPL-ACNC: <10 IU/ML (ref 0–13.9)
TSH SERPL DL<=0.005 MIU/L-ACNC: 1.47 UIU/ML (ref 0.45–4.5)
VIT B12 SERPL-MCNC: 288 PG/ML (ref 211–946)

## 2017-10-04 ENCOUNTER — HOSPITAL ENCOUNTER (OUTPATIENT)
Dept: MRI IMAGING | Age: 62
Discharge: HOME | End: 2017-10-04
Attending: PSYCHIATRY & NEUROLOGY
Payer: MEDICARE

## 2017-10-04 ENCOUNTER — HOSPITAL ENCOUNTER (OUTPATIENT)
Dept: NEUROLOGY | Age: 62
Discharge: HOME | End: 2017-10-04
Attending: PSYCHIATRY & NEUROLOGY
Payer: MEDICARE

## 2017-10-04 VITALS — WEIGHT: 167 LBS | BODY MASS INDEX: 28.67 KG/M2

## 2017-10-04 DIAGNOSIS — R42 DIZZINESS: ICD-10-CM

## 2017-10-04 DIAGNOSIS — R56.9 CONVULSIONS, UNSPECIFIED CONVULSION TYPE (HCC): ICD-10-CM

## 2017-10-04 DIAGNOSIS — G43.009 MIGRAINE WITHOUT AURA AND WITHOUT STATUS MIGRAINOSUS, NOT INTRACTABLE: ICD-10-CM

## 2017-10-04 DIAGNOSIS — R43.0 ANOSMIA: ICD-10-CM

## 2017-10-04 DIAGNOSIS — R20.2 PARESTHESIA: ICD-10-CM

## 2017-10-04 DIAGNOSIS — R29.6 FREQUENT FALLS: ICD-10-CM

## 2017-10-04 DIAGNOSIS — R26.9 GAIT DISORDER: ICD-10-CM

## 2017-10-04 PROCEDURE — 74011250636 HC RX REV CODE- 250/636: Performed by: PSYCHIATRY & NEUROLOGY

## 2017-10-04 PROCEDURE — 70553 MRI BRAIN STEM W/O & W/DYE: CPT

## 2017-10-04 PROCEDURE — A9576 INJ PROHANCE MULTIPACK: HCPCS | Performed by: PSYCHIATRY & NEUROLOGY

## 2017-10-04 RX ADMIN — GADOTERIDOL 10 ML: 279.3 INJECTION, SOLUTION INTRAVENOUS at 14:47

## 2017-10-06 NOTE — PROCEDURES
[address], [address] [place] [address] Av   EEG       Name:  Yesi Iqbal   MR#:  [id number]   :  1955   Account #:  [de-identified]    Date of Procedure:  10/04/2017   Date of Adm:  10/04/2017       REQUESTING PHYSICIAN: Steve Hale MD    HISTORY: The patient is a 54-year-old female who is being evaluated   for problems with memory and increasing episodes of falls/near-  syncope. DESCRIPTION: This is an 18-channel EEG performed on an awake   patient. The dominant posterior background rhythm consists of   symmetric, very well modulated, medium voltage rhythms in the 9 to 10   Hz frequency range. A mixture of alpha and faster beta frequencies are   seen in the frontal head region. Drowsiness and sleep states were not   recorded. Photic stimulation elicits a symmetric driving response. Hyperventilation did not produce any abnormalities. ELECTROENCEPHALOGRAM SUMMARY: Normal study. CLINICAL INTERPRETATION: This is a normal   electroencephalogram during wakeful state of the patient. No   lateralizing or epileptiform features were noted.         Valencia Painting MD      AS / Inge Weston   D:  10/06/2017   08:43   T:  10/06/2017   10:39   Job #:  [id number]

## 2017-10-23 ENCOUNTER — OFFICE VISIT (OUTPATIENT)
Dept: NEUROLOGY | Age: 62
End: 2017-10-23

## 2017-10-23 VITALS
HEART RATE: 73 BPM | BODY MASS INDEX: 29.19 KG/M2 | HEIGHT: 64 IN | DIASTOLIC BLOOD PRESSURE: 94 MMHG | RESPIRATION RATE: 18 BRPM | TEMPERATURE: 98.1 F | WEIGHT: 171 LBS | SYSTOLIC BLOOD PRESSURE: 143 MMHG

## 2017-10-23 DIAGNOSIS — R26.9 GAIT DISORDER: ICD-10-CM

## 2017-10-23 DIAGNOSIS — R20.2 PARESTHESIA: ICD-10-CM

## 2017-10-23 DIAGNOSIS — M54.41 CHRONIC BILATERAL LOW BACK PAIN WITH BILATERAL SCIATICA: ICD-10-CM

## 2017-10-23 DIAGNOSIS — R42 DIZZINESS: ICD-10-CM

## 2017-10-23 DIAGNOSIS — G89.29 CHRONIC BILATERAL LOW BACK PAIN WITH BILATERAL SCIATICA: ICD-10-CM

## 2017-10-23 DIAGNOSIS — M54.42 CHRONIC BILATERAL LOW BACK PAIN WITH BILATERAL SCIATICA: ICD-10-CM

## 2017-10-23 DIAGNOSIS — G43.009 MIGRAINE WITHOUT AURA AND WITHOUT STATUS MIGRAINOSUS, NOT INTRACTABLE: Primary | ICD-10-CM

## 2017-10-23 RX ORDER — BUTALBITAL, ACETAMINOPHEN, CAFFEINE AND CODEINE PHOSPHATE 50; 325; 40; 30 MG/1; MG/1; MG/1; MG/1
1 CAPSULE ORAL
Qty: 40 CAP | Refills: 1 | Status: SHIPPED | OUTPATIENT
Start: 2017-10-23 | End: 2021-10-24

## 2017-10-23 RX ORDER — GABAPENTIN 300 MG/1
300 CAPSULE ORAL 3 TIMES DAILY
Qty: 90 CAP | Refills: 3 | Status: SHIPPED | OUTPATIENT
Start: 2017-10-23 | End: 2017-12-26 | Stop reason: DRUGHIGH

## 2017-10-23 NOTE — PROGRESS NOTES
Chief Complaint   Patient presents with    Other     Polyneuropathy     1. Have you been to the ER, urgent care clinic since your last visit? Hospitalized since your last visit? No    2. Have you seen or consulted any other health care providers outside of the [address] [place] [address] since your last visit? Include any pap smears or colon screening.  Dr. Madhuri Lockett

## 2017-10-23 NOTE — MR AVS SNAPSHOT
Visit Information Date & Time Provider Department Dept. Phone Encounter #  
 10/23/2017  3:00 PM Louis Collins MD [facility] at [facility] [phone] [id number] Follow-up Instructions Return in about 2 months (around 12/23/2017). Upcoming Health Maintenance Date Due Hepatitis C Screening 1955 Pneumococcal 19-64 Medium Risk (1 of 1 - PPSV23) 10/28/1973 DTaP/Tdap/Td series (1 - Tdap) 10/28/1975 PAP AKA CERVICAL CYTOLOGY 10/28/1975 BREAST CANCER SCRN MAMMOGRAM 10/28/2004 FOBT Q 1 YEAR AGE 50-75 10/28/2004 ZOSTER VACCINE AGE 60> 8/28/2014 INFLUENZA AGE 9 TO ADULT 8/1/2017 Allergies as of 10/23/2017  Review Complete On: 10/23/2017 By: Orlando Read MD  
 No Known Allergies Current Immunizations  Never Reviewed No immunizations on file. Not reviewed this visit You Were Diagnosed With   
  
 Codes Comments Migraine without aura and without status migrainosus, not intractable    -  Primary ICD-10-CM: G43.009 ICD-9-CM: 346.10 Chronic bilateral low back pain with bilateral sciatica     ICD-10-CM: M54.42, M54.41, G89.29 ICD-9-CM: 724.2, 724.3, 338.29 Gait disorder     ICD-10-CM: R26.9 ICD-9-CM: 085. 2 Paresthesia     ICD-10-CM: R20.2 ICD-9-CM: 782.0 Dizziness     ICD-10-CM: G42 ICD-9-CM: 780.4 Vitals BP Pulse Temp Resp Height(growth percentile) (!) 143/94 (BP 1 Location: Right arm, BP Patient Position: Sitting) 73 98.1 °F (36.7 °C) (Temporal) 18 5' 4\" (1.626 m) Weight(growth percentile) BMI OB Status Smoking Status 171 lb (77.6 kg) 29.35 kg/m2 Postmenopausal Current Every Day Smoker BMI and BSA Data Body Mass Index Body Surface Area  
 29.35 kg/m 2 1.87 m 2 Preferred Pharmacy Pharmacy Name Phone [phone], [phone] [phone] Your Updated Medication List  
  
   
This list is accurate as of: 10/23/17  4:10 PM.  Always use your most recent med list.  
  
  
  
  
 albuterol 90 mcg/actuation inhaler Commonly known as:  PROVENTIL HFA, VENTOLIN HFA, PROAIR HFA Take 2-3 Puffs by inhalation every six (6) hours as needed. albuterol-ipratropium 2.5 mg-0.5 mg/3 ml Nebu Commonly known as:  DUO-NEB  
3 mL by Nebulization route every four (4) hours. ATROVENT HFA 17 mcg/actuation inhaler Generic drug:  ipratropium Take 2-3 Puffs by inhalation every six (6) hours as needed. baclofen 10 mg tablet Commonly known as:  LIORESAL  
TAKE 1 TABLET BY MOUTH TWICE DAILY  
  
 BC HEADACHE POWDER PO Take  by mouth as needed. BONIVA 150 mg tablet Generic drug:  ibandronate Take 150 mg by mouth every thirty (30) days. buPROPion  mg XL tablet Commonly known as:  WELLBUTRIN XL  
TK 1 T PO D  
  
 CALCIUM WITH VITAMIN D tablet Generic drug:  calcium-cholecalciferol (D3) Take 1 Tab by mouth two (2) times a day. codeine-butalbital-acetaminophen-caffeine -05-30 mg per capsule Commonly known as:  FIORICET WITH CODEINE Take 1 Cap by mouth every four (4) hours as needed for Headache. Max Daily Amount: 6 Caps.  
  
 diazePAM 5 mg tablet Commonly known as:  VALIUM Take 5 mg by mouth daily as needed. ergocalciferol 50,000 unit capsule Commonly known as:  ERGOCALCIFEROL TK 1 C PO TWICE A WEEK  AS DIRECTED  
  
 FLUVIRIN 6031-6991 Susp injection Generic drug:  influenza vaccine 2017-18 (4 yrs+) ADM 0.5ML IM UTD  
  
 gabapentin 300 mg capsule Commonly known as:  NEURONTIN Take 1 Cap by mouth three (3) times daily. ibuprofen 400 mg tablet Commonly known as:  MOTRIN Take 1 Tab by mouth every eight (8) hours as needed for Pain. ICY HOT EX  
by Apply Externally route daily. levothyroxine 88 mcg tablet Commonly known as:  SYNTHROID TK 1 T PO QD  
  
 LIDODERM 5 % Generic drug:  lidocaine 1 Patch by TransDERmal route every twelve (12) hours. lisinopril 10 mg tablet Commonly known as:  Denae Dang TK 1 T PO QD  
  
 OTHER(NON-FORMULARY) Take 80 mg by mouth as needed. \"Asacolec\"   Indications: CONSTIPATION  
  
 oxyCODONE-acetaminophen  mg per tablet Commonly known as:  PERCOCET 10 Take 1 Tab by mouth every four (4) hours as needed for Pain. predniSONE 20 mg tablet Commonly known as:  Orlean Aas Take  by mouth as needed. QUEtiapine 100 mg tablet Commonly known as:  SEROquel Take 100 mg by mouth nightly. simvastatin 40 mg tablet Commonly known as:  ZOCOR Take 40 mg by mouth nightly. SPIRIVA WITH HANDIHALER 18 mcg inhalation capsule Generic drug:  tiotropium Take 1 Cap by inhalation daily. Indications: CHRONIC OBSTRUCTIVE PULMONARY DISEASE WITH BRONCHOSPASMS  
  
 SUMAtriptan 100 mg tablet Commonly known as:  IMITREX  
1 tab at onset of moderate-severe migraine; may repeat 1 tab in 2 hours; Limit: 2 tabs in 24/ hrs, not more than 3 days a week  
  
 venlafaxine 75 mg tablet Commonly known as:  [facility] Take 25 mg by mouth two (2) times a day. Prescriptions Printed Refills  
 codeine-butalbital-acetaminophen-caffeine (FIORICET WITH CODEINE) -60-30 mg per capsule 1 Sig: Take 1 Cap by mouth every four (4) hours as needed for Headache. Max Daily Amount: 6 Caps. Class: Print Route: Oral  
  
Prescriptions Sent to Pharmacy Refills  
 gabapentin (NEURONTIN) 300 mg capsule 3 Sig: Take 1 Cap by mouth three (3) times daily. Class: Normal  
 Pharmacy: [facility] [address] [place] [address] #: [phone] Route: Oral  
  
Follow-up Instructions Return in about 2 months (around 12/23/2017). Introducing [facility] SERVICES! [address] [facility] introduces [employer] patient portal. Now you can access parts of your medical record, email your doctor's office, and request medication refills online. 1. In your internet browser, go to [email or web address] 2. Click on the First Time User? Click Here link in the Sign In box. You will see the New Member Sign Up page. 3. Enter your [employer] Access Code exactly as it appears below. You will not need to use this code after youve completed the sign-up process. If you do not sign up before the expiration date, you must request a new code. · [employer] Access Code: [email or web address] Expires: 11/13/2017 10:10 AM 
 
4. Enter the last four digits of your Social Security Number (xxxx) and Date of Birth (mm/dd/yyyy) as indicated and click Submit. You will be taken to the next sign-up page. 5. Create a [employer] ID. This will be your [employer] login ID and cannot be changed, so think of one that is secure and easy to remember. 6. Create a [employer] password. You can change your password at any time. 7. Enter your Password Reset Question and Answer. This can be used at a later time if you forget your password. 8. Enter your e-mail address. You will receive e-mail notification when new information is available in [address] 9. Click Sign Up. You can now view and download portions of your medical record. 10. Click the Download Summary menu link to download a portable copy of your medical information. If you have questions, please visit the Frequently Asked Questions section of the [employer] website. Remember, [employer] is NOT to be used for urgent needs. For medical emergencies, dial 911. Now available from your iPhone and Android! Please provide this summary of care documentation to your next provider. Your primary care clinician is listed as Claudette Bugler. If you have any questions after today's visit, please call [phone].

## 2017-10-23 NOTE — PROGRESS NOTES
Neurology Progress Note      NAME:  Nicolette Hair   :   1955   MRN:   [id number]     Date/Time:  10/23/2017  Subjective:      Nicolette Hair is a 57 y.o. female here today for follow up for headache and results review. Says she still has Headache but the frequency and intensity seem to have improved, headache throbbing in nature associated with nausea blurry vision, aggravated by stress. Pain is sharp in nature mostly low back and radiate down the legs, at times making the legs weak but  no obvious fall reported  Brain MRI, EEG, and blood work reviewed with patient. Review of Systems - General ROS: positive for  - fatigue and sleep disturbance  Psychological ROS: positive for - anxiety and sleep disturbances  Ophthalmic ROS: positive for - blurry vision, decreased vision and photophobia  ENT ROS: positive for - headaches, tinnitus and vertigo  Allergy and Immunology ROS: negative  Hematological and Lymphatic ROS: negative  Endocrine ROS: negative  Respiratory ROS: no cough, shortness of breath, or wheezing  Cardiovascular ROS: no chest pain or dyspnea on exertion  Gastrointestinal ROS: no abdominal pain, change in bowel habits, or black or bloody stools  Genito-Urinary ROS: no dysuria, trouble voiding, or hematuria  Musculoskeletal ROS: positive for - gait disturbance, joint pain, joint stiffness, joint swelling, muscle pain and muscular weakness  Neurological ROS: positive for - dizziness, headaches, numbness/tingling, visual changes and weakness  Dermatological ROS: negative      Medications reviewed:  Current Outpatient Prescriptions   Medication Sig Dispense Refill    buPROPion XL (WELLBUTRIN XL) 300 mg XL tablet TK 1 T PO D  2    levothyroxine (SYNTHROID) 88 mcg tablet TK 1 T PO QD  2    lisinopril (PRINIVIL, ZESTRIL) 10 mg tablet TK 1 T PO QD  5    QUEtiapine (SEROQUEL) 100 mg tablet Take 100 mg by mouth nightly.  predniSONE (DELTASONE) 20 mg tablet Take  by mouth as needed.       ASPIRIN/SALICYLAMIDE/CAFFEINE (BC HEADACHE POWDER PO) Take  by mouth as needed.  METHYL SALICYLATE/MENTHOL (ICY HOT EX) by Apply Externally route daily.  gabapentin (NEURONTIN) 100 mg capsule TAKE 1 CAPSULE BY MOUTH THREE TIMES DAILY 270 Cap 1    baclofen (LIORESAL) 10 mg tablet TAKE 1 TABLET BY MOUTH TWICE DAILY 180 Tab 1    ibuprofen (MOTRIN) 400 mg tablet Take 1 Tab by mouth every eight (8) hours as needed for Pain. (Patient taking differently: Take 800 mg by mouth every eight (8) hours as needed for Pain.) 30 Tab 0    albuterol (PROVENTIL HFA, VENTOLIN HFA) 90 mcg/Actuation inhaler Take 2-3 Puffs by inhalation every six (6) hours as needed.  ipratropium (ATROVENT HFA) 17 mcg/Actuation inhaler Take 2-3 Puffs by inhalation every six (6) hours as needed.  albuterol-ipratropium (DUO-NEB) 0.5 mg-3 mg(2.5 mg base)/3 mL nebulizer solution 3 mL by Nebulization route every four (4) hours.  simvastatin (ZOCOR) 40 mg tablet Take 40 mg by mouth nightly.  diazepam (VALIUM) 5 mg tablet Take 5 mg by mouth daily as needed.  calcium-cholecalciferol, D3, (CALCIUM WITH VITAMIN D) tablet Take 1 Tab by mouth two (2) times a day.  ergocalciferol (ERGOCALCIFEROL) 50,000 unit capsule TK 1 C PO TWICE A WEEK  AS DIRECTED  3    FLUVIRIN 6832-2500 susp injection ADM 0.5ML IM UTD  0    SUMAtriptan (IMITREX) 100 mg tablet 1 tab at onset of moderate-severe migraine; may repeat 1 tab in 2 hours; Limit: 2 tabs in 24/ hrs, not more than 3 days a week 12 Tab 3    oxyCODONE-acetaminophen (PERCOCET 10)  mg per tablet Take 1 Tab by mouth every four (4) hours as needed for Pain. 60 Tab 0    venlafaxine (EFFEXOR) 75 mg tablet Take 25 mg by mouth two (2) times a day.  tiotropium (SPIRIVA WITH HANDIHALER) 18 mcg inhalation capsule Take 1 Cap by inhalation daily.  Indications: CHRONIC OBSTRUCTIVE PULMONARY DISEASE WITH BRONCHOSPASMS      ibandronate (BONIVA) 150 mg tablet Take 150 mg by mouth every thirty (30) days.  lidocaine (LIDODERM) 5 %(700 mg/patch) 1 Patch by TransDERmal route every twelve (12) hours.  OTHER,NON-FORMULARY, Take 80 mg by mouth as needed. \"Asacolec\"   Indications: CONSTIPATION          Objective:   Vitals:  Vitals:    10/23/17 1530   BP: (!) 143/94   Pulse: 73   Resp: 18   Temp: 98.1 °F (36.7 °C)   TempSrc: Temporal   Weight: 171 lb (77.6 kg)   Height: 5' 4\" (1.626 m)   PainSc:   8   PainLoc: Generalized               Lab Data Reviewed:  Lab Results   Component Value Date/Time    WBC 6.5 04/13/2012 04:50 PM    HCT 42.0 04/13/2012 04:50 PM    HGB 14.5 04/13/2012 04:50 PM    PLATELET 270 44/29/5754 04:50 PM       Lab Results   Component Value Date/Time    Sodium 142 04/13/2012 04:50 PM    Potassium 3.4 04/13/2012 04:50 PM    Chloride 110 04/13/2012 04:50 PM    CO2 20 04/13/2012 04:50 PM    Glucose 171 04/13/2012 04:50 PM    BUN 10 04/13/2012 04:50 PM    Creatinine 0.6 04/13/2012 04:50 PM    Calcium 8.5 04/13/2012 04:50 PM       No components found for: TROPQUANT    No results found for: MOE      No results found for: HBA1C, HGBE8, FRH3CSCL, YTP0GGDT     Lab Results   Component Value Date/Time    Vitamin B12 288 09/25/2017 12:00 PM       No results found for: MOE, ANARX, ANAIGG, XBANA    No results found for: CHOL, CHOLPOCT, CHOLX, CHLST, CHOLV, HDL, HDLPOC, LDL, LDLCPOC, LDLC, DLDLP, VLDLC, VLDL, TGLX, TRIGL, TRIGP, TGLPOCT, CHHD, CHHDX      CT Results (recent): MRI Results (recent):    Results from [place] encounter on 10/04/17   MRI BRAIN W WO CONT   Narrative EXAM:  MRI BRAIN W WO CONT    INDICATION:    dizziness, frequent fall    COMPARISON:  None. CONTRAST: 10 ml ProHance    TECHNIQUE:    MR imaging of the brain was performed with sagittal T1, axial T1, T2, FLAIR,  GRE, DWI/ADC; multiplanar T1 images prior to and following uneventful  intravenous contrast administration.     FINDINGS: The ventricular size and configuration are normal. There is mild to  moderate periventricular and scattered subcortical white matter disease. There  is no evidence of mass, hemorrhage, acute infarct or abnormal extra-axial fluid  collection. Normal appearing flow-voids are present in the vertebral, basilar  and carotid artery systems. The craniocervical junction is normal.  The  structures at the cranial base including paranasal sinuses and mastoid air cells  are unremarkable. There is no abnormal parenchymal or meningeal enhancement. Impression IMPRESSION:  Mild to moderate white matter disease likely to chronic small  vessel ischemic disease. No evidence of acute process. IR Results (recent):    Results from [place] encounter on 10/28/11   IR ARTHROGRAM SI JOINT   Narrative **Final Report**      ICD Codes / Adm. Diagnosis: 719.45  724.2 / PAIN IN JOINT, PELVIC REGION A    LUMBAGO  Examination:  XA ARTHROGRAM SI JOINT  - [id number] - Oct 28 2011 10:28AM  Accession No:  [id number]  Reason:  lumbago      REPORT:  HISTORY: Back pain. PROCEDURE: Informed consent was obtained. Risks of bleeding, infection,   allergy, leg weakness, elevation of blood sugar, steroid flare, low blood   pressure, etc., were explained and understood. MRI was reviewed. Following   sterile prep and local anesthesia, percutaneous placement of a 22-gauge   needle into the collateral sacroiliac joints was performed via posterior   approach under fluoroscopic biplane imaging control and contrast monitoring. 120 mg of Depo-Medrol and 4 mL of Sensorcaine were slowly injected during   needle was removed and dressing placed. No complications are evident. FINDINGS: Needle tip lies within the joint and contrast opacifies the joint   and the adjacent soft tissues. IMPRESSION:    Successful bilateral sacroiliac joint injections.           Signing/Reading Doctor: Breanne Marie ([id number])    Approved: Breanne Marie ([id number])  10/28/2011 VAS/US Results (recent):  No results found for this or any previous visit. PHYSICAL EXAM:  General:    Alert, cooperative, no distress, appears stated age. Head:   Normocephalic, without obvious abnormality, atraumatic. Eyes:   Conjunctivae/corneas clear. PERRLA  Nose:  Nares normal. No drainage or sinus tenderness. Throat:    Lips, mucosa, and tongue normal.  No Thrush  Neck:  Supple, symmetrical,  no adenopathy, thyroid: non tender    no carotid bruit and no JVD. Back:    Symmetric,  No CVA tenderness. Lungs:   Clear to auscultation bilaterally. No Wheezing or Rhonchi. No rales. Chest wall:  No tenderness or deformity. No Accessory muscle use. Heart:   Regular rate and rhythm,  no murmur, rub or gallop. Abdomen:   Soft, non-tender. Not distended. Bowel sounds normal. No masses  Extremities: Extremities normal, atraumatic, No cyanosis. No edema. No clubbing  Skin:     Texture, turgor normal. No rashes or lesions. Not Jaundiced  Lymph nodes: Cervical, supraclavicular normal.  Psych:  Good insight. Not depressed. Not anxious or agitated. NEUROLOGICAL EXAM:  Appearance: The patient is well developed, well nourished, provides a coherent history and is in no acute distress. Mental Status: Oriented to time, place and person. Mood and affect appropriate. Cranial Nerves:   Intact visual fields. Fundi are benign. JOSE, EOM's full, no nystagmus, no ptosis. Facial sensation is normal. Corneal reflexes are intact. Facial movement is symmetric. Hearing is normal bilaterally. Palate is midline with normal sternocleidomastoid and trapezius muscles are normal. Tongue is midline. Motor:  5/5 strength in upper and lower proximal and distal muscles. Normal bulk and tone. No fasciculations. Reflexes:   Deep tendon reflexes 3+/4 and symmetrical.   Sensory:   Decreased sensation to touch, pinprick and vibration. Gait:  Normal gait. Tremor:   No tremor noted.    Cerebellar:  No cerebellar signs present. Neurovascular:  Normal heart sounds and regular rhythm, peripheral pulses intact, and no carotid bruits. Assesment  1. Migraine without aura and without status migrainosus, not intractable  Continue management    2. Chronic bilateral low back pain with bilateral sciatica  Continue management  Folllowing with orthopedic  3. Gait disorder  Continue management    4. Paresthesia  Continue management    5. Dizziness  Continue management    ___________________________________________________  PLAN:Medication  discussed with patient      ICD-10-CM ICD-9-CM    1. Migraine without aura and without status migrainosus, not intractable G43.009 346.10    2. Chronic bilateral low back pain with bilateral sciatica M54.42 724.2     M54.41 724.3     G89.29 338.29    3. Gait disorder R26.9 781.2    4. Paresthesia R20.2 782.0    5. Dizziness R42 780.4      Follow-up Disposition:  Return in about 3 months (around 1/23/2018).          ___________________________________________________    Total time spent with patient:  []15   []25   []35   [] __ minutes    Care Plan discussed with:    [x]Patient   []Family    []Care Manager   []Consultant/Specialist :    ___________________________________________________    Attending Physician: Morgan Burns MD

## 2017-12-26 ENCOUNTER — OFFICE VISIT (OUTPATIENT)
Dept: NEUROLOGY | Age: 62
End: 2017-12-26

## 2017-12-26 VITALS
HEIGHT: 64 IN | SYSTOLIC BLOOD PRESSURE: 132 MMHG | BODY MASS INDEX: 28.65 KG/M2 | HEART RATE: 90 BPM | OXYGEN SATURATION: 98 % | DIASTOLIC BLOOD PRESSURE: 78 MMHG | WEIGHT: 167.8 LBS

## 2017-12-26 DIAGNOSIS — G89.29 CHRONIC BILATERAL LOW BACK PAIN WITHOUT SCIATICA: ICD-10-CM

## 2017-12-26 DIAGNOSIS — M54.50 CHRONIC BILATERAL LOW BACK PAIN WITHOUT SCIATICA: ICD-10-CM

## 2017-12-26 DIAGNOSIS — M79.18 MYOFASCIAL MUSCLE PAIN: ICD-10-CM

## 2017-12-26 DIAGNOSIS — G43.009 MIGRAINE WITHOUT AURA AND WITHOUT STATUS MIGRAINOSUS, NOT INTRACTABLE: Primary | ICD-10-CM

## 2017-12-26 DIAGNOSIS — R20.2 PARESTHESIA: ICD-10-CM

## 2017-12-26 DIAGNOSIS — R25.1 TREMOR OF RIGHT HAND: ICD-10-CM

## 2017-12-26 DIAGNOSIS — R26.9 GAIT DISORDER: ICD-10-CM

## 2017-12-26 PROBLEM — F33.9 RECURRENT DEPRESSION (HCC): Status: ACTIVE | Noted: 2017-12-26

## 2017-12-26 RX ORDER — LIDOCAINE 50 MG/G
1 PATCH TOPICAL EVERY 24 HOURS
Qty: 30 EACH | Refills: 3 | Status: SHIPPED | OUTPATIENT
Start: 2017-12-26

## 2017-12-26 RX ORDER — PREDNISONE 20 MG/1
20 TABLET ORAL AS NEEDED
Qty: 20 TAB | Refills: 1 | Status: SHIPPED | OUTPATIENT
Start: 2017-12-26 | End: 2018-10-25 | Stop reason: SDUPTHER

## 2017-12-26 RX ORDER — GABAPENTIN 400 MG/1
400 CAPSULE ORAL 3 TIMES DAILY
Qty: 90 CAP | Refills: 2 | Status: SHIPPED | OUTPATIENT
Start: 2017-12-26 | End: 2018-03-26 | Stop reason: SDUPTHER

## 2017-12-26 RX ORDER — TRAMADOL HYDROCHLORIDE 50 MG/1
50 TABLET ORAL
Qty: 40 TAB | Refills: 1 | Status: SHIPPED | OUTPATIENT
Start: 2017-12-26 | End: 2018-03-26 | Stop reason: SDUPTHER

## 2017-12-26 NOTE — PROGRESS NOTES
Neurology Progress Note    NAME:  Alek Sanchez   :   1955   MRN:   [id number]     Date/Time:  2017  Subjective:      Alek Sanchez is a 60 y.o. female here today for follow up follow for pain, headache. Pain is persistent , sharp in nature, aggravated by activity  Headache came back becoming more frequent about 4x/week, headache is throbbing in nature frontal, occasional shrp pain coming from the back of the head, medication not helping, says she is now taking a lot of BCP and motrin, they only take the edge off. Back pain is sharp in nature, radiate down the legs, aggravated by activity. I told patient that taking a lot of analgesic can cause more headache  Says she is having Tremors, tremors are mostly when patient tries to do something. I will monitor tremor if it progresses , I will consider starting patient on medication.   Review of Systems - General ROS: positive for  - fatigue and sleep disturbance  Psychological ROS: positive for - anxiety, depression and sleep disturbances  Ophthalmic ROS: positive for - blurry vision, decreased vision and photophobia  ENT ROS: positive for - hearing change, tinnitus, vertigo and visual changes  Allergy and Immunology ROS: negative  Hematological and Lymphatic ROS: negative  Endocrine ROS: negative  Respiratory ROS: no cough, shortness of breath, or wheezing  Cardiovascular ROS: no chest pain or dyspnea on exertion  Gastrointestinal ROS: no abdominal pain, change in bowel habits, or black or bloody stools  Genito-Urinary ROS: no dysuria, trouble voiding, or hematuria  Musculoskeletal ROS: positive for - joint pain, joint stiffness, joint swelling, muscle pain and muscular weakness  Neurological ROS: positive for - dizziness, headaches, impaired coordination/balance, numbness/tingling, visual changes and weakness  Dermatological ROS: negative    Medications reviewed:  Current Outpatient Prescriptions   Medication Sig Dispense Refill    SUMAtriptan (IMITREX) 100 mg tablet Take 1 Tab by mouth once as needed for Migraine for up to 1 dose. 12 Tab 1    codeine-butalbital-acetaminophen-caffeine (FIORICET WITH CODEINE) -23-30 mg per capsule Take 1 Cap by mouth every four (4) hours as needed for Headache. Max Daily Amount: 6 Caps. 40 Cap 1    gabapentin (NEURONTIN) 300 mg capsule Take 1 Cap by mouth three (3) times daily. 90 Cap 3    buPROPion XL (WELLBUTRIN XL) 300 mg XL tablet TK 1 T PO D  2    ergocalciferol (ERGOCALCIFEROL) 50,000 unit capsule TK 1 C PO TWICE A WEEK  AS DIRECTED  3    FLUVIRIN [id number] susp injection ADM 0.5ML IM UTD  0    levothyroxine (SYNTHROID) 88 mcg tablet TK 1 T PO QD  2    lisinopril (PRINIVIL, ZESTRIL) 10 mg tablet TK 1 T PO QD  5    QUEtiapine (SEROQUEL) 100 mg tablet Take 100 mg by mouth nightly.  predniSONE (DELTASONE) 20 mg tablet Take  by mouth as needed.  ASPIRIN/SALICYLAMIDE/CAFFEINE (BC HEADACHE POWDER PO) Take  by mouth as needed.  METHYL SALICYLATE/MENTHOL (ICY HOT EX) by Apply Externally route daily.  SUMAtriptan (IMITREX) 100 mg tablet 1 tab at onset of moderate-severe migraine; may repeat 1 tab in 2 hours; Limit: 2 tabs in 24/ hrs, not more than 3 days a week 12 Tab 3    baclofen (LIORESAL) 10 mg tablet TAKE 1 TABLET BY MOUTH TWICE DAILY 180 Tab 1    oxyCODONE-acetaminophen (PERCOCET 10)  mg per tablet Take 1 Tab by mouth every four (4) hours as needed for Pain. 60 Tab 0    ibuprofen (MOTRIN) 400 mg tablet Take 1 Tab by mouth every eight (8) hours as needed for Pain. (Patient taking differently: Take 800 mg by mouth every eight (8) hours as needed for Pain.) 30 Tab 0    venlafaxine (EFFEXOR) 75 mg tablet Take 25 mg by mouth two (2) times a day.  tiotropium (SPIRIVA WITH HANDIHALER) 18 mcg inhalation capsule Take 1 Cap by inhalation daily.  Indications: CHRONIC OBSTRUCTIVE PULMONARY DISEASE WITH BRONCHOSPASMS      albuterol (PROVENTIL HFA, VENTOLIN HFA) 90 mcg/Actuation inhaler Take 2-3 Puffs by inhalation every six (6) hours as needed.  ipratropium (ATROVENT HFA) 17 mcg/Actuation inhaler Take 2-3 Puffs by inhalation every six (6) hours as needed.  albuterol-ipratropium (DUO-NEB) 0.5 mg-3 mg(2.5 mg base)/3 mL nebulizer solution 3 mL by Nebulization route every four (4) hours.  ibandronate (BONIVA) 150 mg tablet Take 150 mg by mouth every thirty (30) days.  lidocaine (LIDODERM) 5 %(700 mg/patch) 1 Patch by TransDERmal route every twelve (12) hours.  OTHER,NON-FORMULARY, Take 80 mg by mouth as needed. \"Asacolec\"   Indications: CONSTIPATION      simvastatin (ZOCOR) 40 mg tablet Take 40 mg by mouth nightly.  diazepam (VALIUM) 5 mg tablet Take 5 mg by mouth daily as needed.  calcium-cholecalciferol, D3, (CALCIUM WITH VITAMIN D) tablet Take 1 Tab by mouth two (2) times a day.           Objective:   Vitals:  Vitals:    12/26/17 1456   BP: 132/78   Pulse: 90   SpO2: 98%   Weight: 167 lb 12.8 oz (76.1 kg)   Height: 5' 4\" (1.626 m)   PainSc:   7   PainLoc: Generalized     Lab Data Reviewed:  Lab Results   Component Value Date/Time    WBC 6.5 04/13/2012 04:50 PM    HCT 42.0 04/13/2012 04:50 PM    HGB 14.5 04/13/2012 04:50 PM    PLATELET 688 94/30/0452 04:50 PM       Lab Results   Component Value Date/Time    Sodium 142 04/13/2012 04:50 PM    Potassium 3.4 04/13/2012 04:50 PM    Chloride 110 04/13/2012 04:50 PM    CO2 20 04/13/2012 04:50 PM    Glucose 171 04/13/2012 04:50 PM    BUN 10 04/13/2012 04:50 PM    Creatinine 0.6 04/13/2012 04:50 PM    Calcium 8.5 04/13/2012 04:50 PM       No components found for: TROPQUANT    No results found for: MOE      No results found for: HBA1C, HGBE8, BLD0YFTS, AJN6PZKM     Lab Results   Component Value Date/Time    Vitamin B12 288 09/25/2017 12:00 PM       No results found for: MOE, ANARX, ANAIGG, XBANA    No results found for: CHOL, CHOLPOCT, CHOLX, CHLST, CHOLV, HDL, HDLPOC, LDL, LDLCPOC, LDLC, DLDLP, VLDLC, VLDL, TGLX, TRIGL, TRIGP, TGLPOCT, [facility], [facility]      CT Results (recent): MRI Results (recent):    Results from [place] encounter on 10/04/17   MRI BRAIN W WO CONT   Narrative EXAM:  MRI BRAIN W WO CONT    INDICATION:    dizziness, frequent fall    COMPARISON:  None. CONTRAST: 10 ml ProHance    TECHNIQUE:    MR imaging of the brain was performed with sagittal T1, axial T1, T2, FLAIR,  GRE, DWI/ADC; multiplanar T1 images prior to and following uneventful  intravenous contrast administration. FINDINGS: The ventricular size and configuration are normal. There is mild to  moderate periventricular and scattered subcortical white matter disease. There  is no evidence of mass, hemorrhage, acute infarct or abnormal extra-axial fluid  collection. Normal appearing flow-voids are present in the vertebral, basilar  and carotid artery systems. The craniocervical junction is normal.  The  structures at the cranial base including paranasal sinuses and mastoid air cells  are unremarkable. There is no abnormal parenchymal or meningeal enhancement. Impression IMPRESSION:  Mild to moderate white matter disease likely to chronic small  vessel ischemic disease. No evidence of acute process. IR Results (recent):    Results from [place] encounter on 10/28/11   IR ARTHROGRAM SI JOINT   Narrative **Final Report**      ICD Codes / Adm. Diagnosis: 719.45  724.2 / PAIN IN JOINT, PELVIC REGION A    LUMBAGO  Examination:  XA ARTHROGRAM SI JOINT  - [id number] - Oct 28 2011 10:28AM  Accession No:  [id number]  Reason:  lumbago      REPORT:  HISTORY: Back pain. PROCEDURE: Informed consent was obtained. Risks of bleeding, infection,   allergy, leg weakness, elevation of blood sugar, steroid flare, low blood   pressure, etc., were explained and understood. MRI was reviewed.  Following   sterile prep and local anesthesia, percutaneous placement of a 22-gauge   needle into the collateral sacroiliac joints was performed via posterior   approach under fluoroscopic biplane imaging control and contrast monitoring. 120 mg of Depo-Medrol and 4 mL of Sensorcaine were slowly injected during   needle was removed and dressing placed. No complications are evident. FINDINGS: Needle tip lies within the joint and contrast opacifies the joint   and the adjacent soft tissues. IMPRESSION:    Successful bilateral sacroiliac joint injections. Signing/Reading Doctor: Mikael Berkeley ([id number])    Approved: Mikael Berkeley ([id number])  10/28/2011                                      VAS/US Results (recent):  No results found for this or any previous visit. PHYSICAL EXAM:  General:    Alert, cooperative, no distress, appears stated age. Head:   Normocephalic, without obvious abnormality, atraumatic. Eyes:   Conjunctivae/corneas clear. PERRLA  Nose:  Nares normal. No drainage or sinus tenderness. Throat:    Lips, mucosa, and tongue normal.  No Thrush  Neck:  Supple, symmetrical,  no adenopathy, thyroid: non tender    no carotid bruit and no JVD. Paraspinal tenderness  Back:    Symmetric,  Bilateral tenderness. Lungs:   Clear to auscultation bilaterally. No Wheezing or Rhonchi. No rales. Chest wall:  No tenderness or deformity. No Accessory muscle use. Heart:   Regular rate and rhythm,  no murmur, rub or gallop. Abdomen:   Soft, non-tender. Not distended. Bowel sounds normal. No masses  Extremities: Extremities normal, atraumatic, No cyanosis. No edema. No clubbing  Skin:     Texture, turgor normal. No rashes or lesions. Not Jaundiced  Lymph nodes: Cervical, supraclavicular normal.  Psych:  Good insight. Not depressed. Not anxious or agitated. NEUROLOGICAL EXAM:  Appearance: The patient is well developed, well nourished, provides a coherent history and is in no acute distress. Mental Status: Oriented to time, place and person. Mood and affect appropriate. Cranial Nerves:   Intact visual fields.  Fundi are benign. JOSE, EOM's full, no nystagmus, no ptosis. Facial sensation is normal. Corneal reflexes are intact. Facial movement is symmetric. Hearing is normal bilaterally. Palate is midline with normal sternocleidomastoid and trapezius muscles are normal. Tongue is midline. Motor:  5/5 strength in upper and lower proximal and distal muscles. Normal bulk and tone. No fasciculations. Reflexes:   Deep tendon reflexes 2+/4 and symmetrical.   Sensory:   Decrease sensation to touch, pinprick and vibration. Gait:  Normal gait. Tremor:   No tremor noted. Cerebellar:  No cerebellar signs present. Neurovascular:  Normal heart sounds and regular rhythm, peripheral pulses intact, and no carotid bruits. Assesment  1. Migraine without aura and without status migrainosus, not intractable  Continue management    2. Chronic bilateral low back pain without sciatica  Refer to pain spcialist    3. Myofascial muscle pain  Continue mangement    4. Gait disorder  Physical therapy    5. Paresthesia  Stable    ___________________________________________________  PLAN:Medication reviewed with patient  Discussed the importance of avoiding rebound headaches and the medications associated with them  Discussed the importance of identifying triggers and avoiding them if possible. Discussed common migraine triggers  Discussed over the counter remedies for migraines  Asked to keep a migraine diary and return next visit to develop a plan of approach    ICD-10-CM ICD-9-CM    1. Migraine without aura and without status migrainosus, not intractable G43.009 346.10    2. Chronic bilateral low back pain without sciatica M54.5 724.2     G89.29 338.29    3. Myofascial muscle pain M79.1 729.1    4. Gait disorder R26.9 781.2    5. Paresthesia R20.2 782.0      Follow-up Disposition:  Return in about 3 months (around 3/26/2018).            ___________________________________________________    Total time spent with patient:  []15   []25   []35 [] __ minutes    Care Plan discussed with:    [x]Patient   []Family    []Care Manager   []Consultant/Specialist :    ___________________________________________________    Attending Physician: Willem Rojo MD

## 2017-12-26 NOTE — PATIENT INSTRUCTIONS

## 2017-12-26 NOTE — MR AVS SNAPSHOT
Visit Information Date & Time Provider Department Dept. Phone Encounter #  
 12/26/2017  2:40 PM Louis Selby MD [facility] at [address] [place] [address]  [id number] Follow-up Instructions Return in about 3 months (around 3/26/2018). Upcoming Health Maintenance Date Due Hepatitis C Screening 1955 Pneumococcal 19-64 Medium Risk (1 of 1 - PPSV23) 10/28/1973 DTaP/Tdap/Td series (1 - Tdap) 10/28/1975 PAP AKA CERVICAL CYTOLOGY 10/28/1975 FOBT Q 1 YEAR AGE 50-75 10/28/2004 ZOSTER VACCINE AGE 60> 8/28/2014 Influenza Age 5 to Adult 8/1/2017 Allergies as of 12/26/2017  Review Complete On: 12/26/2017 By: Guy Benítez MD  
 No Known Allergies Current Immunizations  Never Reviewed No immunizations on file. Not reviewed this visit You Were Diagnosed With   
  
 Codes Comments Migraine without aura and without status migrainosus, not intractable    -  Primary ICD-10-CM: G43.009 ICD-9-CM: 346.10 Chronic bilateral low back pain without sciatica     ICD-10-CM: M54.5, G89.29 ICD-9-CM: 724.2, 338.29 Myofascial muscle pain     ICD-10-CM: M79.1 ICD-9-CM: 729.1 Gait disorder     ICD-10-CM: R26.9 ICD-9-CM: 982. 2 Paresthesia     ICD-10-CM: R20.2 ICD-9-CM: 782.0 Tremor of right hand     ICD-10-CM: R25.1 ICD-9-CM: 447. 0 Vitals BP Pulse Height(growth percentile) Weight(growth percentile) SpO2 BMI  
 132/78 90 5' 4\" (1.626 m) 167 lb 12.8 oz (76.1 kg) 98% 28.8 kg/m2 OB Status Smoking Status Postmenopausal Current Every Day Smoker BMI and BSA Data Body Mass Index Body Surface Area  
 28.8 kg/m 2 1.85 m 2 Preferred Pharmacy Pharmacy Name Phone [facility], [address] [phone] Your Updated Medication List  
  
   
 This list is accurate as of: 12/26/17  3:27 PM.  Always use your most recent med list.  
  
  
  
  
 albuterol 90 mcg/actuation inhaler Commonly known as:  PROVENTIL HFA, VENTOLIN HFA, PROAIR HFA Take 2-3 Puffs by inhalation every six (6) hours as needed. albuterol-ipratropium 2.5 mg-0.5 mg/3 ml Nebu Commonly known as:  DUO-NEB  
3 mL by Nebulization route every four (4) hours. ATROVENT HFA 17 mcg/actuation inhaler Generic drug:  ipratropium Take 2-3 Puffs by inhalation every six (6) hours as needed. baclofen 10 mg tablet Commonly known as:  LIORESAL  
TAKE 1 TABLET BY MOUTH TWICE DAILY  
  
 BC HEADACHE POWDER PO Take  by mouth as needed. BONIVA 150 mg tablet Generic drug:  ibandronate Take 150 mg by mouth every thirty (30) days. buPROPion  mg XL tablet Commonly known as:  WELLBUTRIN XL  
TK 1 T PO D  
  
 CALCIUM WITH VITAMIN D tablet Generic drug:  calcium-cholecalciferol (D3) Take 1 Tab by mouth two (2) times a day. codeine-butalbital-acetaminophen-caffeine -81-30 mg capsule Commonly known as:  FIORICET WITH CODEINE Take 1 Cap by mouth every four (4) hours as needed for Headache. Max Daily Amount: 6 Caps.  
  
 diazePAM 5 mg tablet Commonly known as:  VALIUM Take 5 mg by mouth daily as needed. ergocalciferol 50,000 unit capsule Commonly known as:  ERGOCALCIFEROL TK 1 C PO TWICE A WEEK  AS DIRECTED  
  
 FLUVIRIN [id number] Susp injection Generic drug:  influenza vaccine 2017-18 (4 yrs+) ADM 0.5ML IM UTD  
  
 gabapentin 400 mg capsule Commonly known as:  NEURONTIN Take 1 Cap by mouth three (3) times daily. ibuprofen 400 mg tablet Commonly known as:  MOTRIN Take 1 Tab by mouth every eight (8) hours as needed for Pain. ICY HOT EX  
by Apply Externally route daily. levothyroxine 88 mcg tablet Commonly known as:  SYNTHROID TK 1 T PO QD  
  
 lidocaine 5 % Commonly known as:  Elex Helioa  
 1 Patch by TransDERmal route every twenty-four (24) hours. lisinopril 10 mg tablet Commonly known as:  Queen Tasha TK 1 T PO QD  
  
 OTHER(NON-FORMULARY) Take 80 mg by mouth as needed. \"Asacolec\"   Indications: CONSTIPATION  
  
 oxyCODONE-acetaminophen  mg per tablet Commonly known as:  PERCOCET 10 Take 1 Tab by mouth every four (4) hours as needed for Pain. predniSONE 20 mg tablet Commonly known as:  Jackquelyn Mellow Take 1 Tab by mouth as needed. QUEtiapine 100 mg tablet Commonly known as:  SEROquel Take 100 mg by mouth nightly. simvastatin 40 mg tablet Commonly known as:  ZOCOR Take 40 mg by mouth nightly. SPIRIVA WITH HANDIHALER 18 mcg inhalation capsule Generic drug:  tiotropium Take 1 Cap by inhalation daily. Indications: CHRONIC OBSTRUCTIVE PULMONARY DISEASE WITH BRONCHOSPASMS  
  
 * SUMAtriptan 100 mg tablet Commonly known as:  IMITREX  
1 tab at onset of moderate-severe migraine; may repeat 1 tab in 2 hours; Limit: 2 tabs in 24/ hrs, not more than 3 days a week * SUMAtriptan 100 mg tablet Commonly known as:  IMITREX Take 1 Tab by mouth once as needed for Migraine for up to 1 dose. traMADol 50 mg tablet Commonly known as:  ULTRAM  
Take 1 Tab by mouth every six (6) hours as needed for Pain. Max Daily Amount: 200 mg.  
  
 venlafaxine 75 mg tablet Commonly known as:  [facility] Take 25 mg by mouth two (2) times a day. * Notice: This list has 2 medication(s) that are the same as other medications prescribed for you. Read the directions carefully, and ask your doctor or other care provider to review them with you. Prescriptions Printed Refills  
 traMADol (ULTRAM) 50 mg tablet 1 Sig: Take 1 Tab by mouth every six (6) hours as needed for Pain. Max Daily Amount: 200 mg. Class: Print Route: Oral  
  
Prescriptions Sent to Pharmacy  Refills  
 lidocaine (LIDODERM) 5 % 3  
 Si Patch by TransDERmal route every twenty-four (24) hours. Class: Normal  
 Pharmacy: [place] Ph #: [phone] Route: TransDERmal  
 gabapentin (NEURONTIN) 400 mg capsule 2 Sig: Take 1 Cap by mouth three (3) times daily. Class: Normal  
 Pharmacy: [address] Ph #: [phone] Route: Oral  
 predniSONE (DELTASONE) 20 mg tablet 1 Sig: Take 1 Tab by mouth as needed. Class: Normal  
 Pharmacy: [address] Ph #: [phone] Route: Oral  
  
Follow-up Instructions Return in about 3 months (around 3/26/2018). Patient Instructions A Healthy Lifestyle: Care Instructions Your Care Instructions A healthy lifestyle can help you feel good, stay at a healthy weight, and have plenty of energy for both work and play. A healthy lifestyle is something you can share with your whole family. A healthy lifestyle also can lower your risk for serious health problems, such as high blood pressure, heart disease, and diabetes. You can follow a few steps listed below to improve your health and the health of your family. Follow-up care is a key part of your treatment and safety. Be sure to make and go to all appointments, and call your doctor if you are having problems. It's also a good idea to know your test results and keep a list of the medicines you take. How can you care for yourself at home? · Do not eat too much sugar, fat, or fast foods. You can still have dessert and treats now and then. The goal is moderation. · Start small to improve your eating habits. Pay attention to portion sizes, drink less juice and soda pop, and eat more fruits and vegetables. ¨ Eat a healthy amount of food. A 3-ounce serving of meat, for example, is about the size of a deck of cards. Fill the rest of your plate with vegetables and whole grains. ¨ Limit the amount of soda and sports drinks you have every day. Drink more water when you are thirsty. ¨ Eat at least 5 servings of fruits and vegetables every day. It may seem like a lot, but it is not hard to reach this goal. A serving or helping is 1 piece of fruit, 1 cup of vegetables, or 2 cups of leafy, raw vegetables. Have an apple or some carrot sticks as an afternoon snack instead of a candy bar. Try to have fruits and/or vegetables at every meal. 
· Make exercise part of your daily routine. You may want to start with simple activities, such as walking, bicycling, or slow swimming. Try to be active 30 to 60 minutes every day. You do not need to do all 30 to 60 minutes all at once. For example, you can exercise 3 times a day for 10 or 20 minutes. Moderate exercise is safe for most people, but it is always a good idea to talk to your doctor before starting an exercise program. 
· Keep moving. Harlen Skeens the lawn, work in the garden, or [employer]. Take the stairs instead of the elevator at work. · If you smoke, quit. People who smoke have an increased risk for heart attack, stroke, cancer, and other lung illnesses. Quitting is hard, but there are ways to boost your chance of quitting tobacco for good. ¨ Use nicotine gum, patches, or lozenges. ¨ Ask your doctor about stop-smoking programs and medicines. ¨ Keep trying. In addition to reducing your risk of diseases in the future, you will notice some benefits soon after you stop using tobacco. If you have shortness of breath or asthma symptoms, they will likely get better within a few weeks after you quit. · Limit how much alcohol you drink. Moderate amounts of alcohol (up to 2 drinks a day for men, 1 drink a day for women) are okay.  But drinking too much can lead to liver problems, high blood pressure, and other health problems. Family health If you have a family, there are many things you can do together to improve your health. · Eat meals together as a family as often as possible. · Eat healthy foods. This includes fruits, vegetables, lean meats and dairy, and whole grains. · Include your family in your fitness plan. Most people think of activities such as jogging or tennis as the way to fitness, but there are many ways you and your family can be more active. Anything that makes you breathe hard and gets your heart pumping is exercise. Here are some tips: 
¨ Walk to do errands or to take your child to school or the bus. ¨ Go for a family bike ride after dinner instead of watching TV. Where can you learn more? Go to http://timi-cristhian.info/. Enter R142 in the search box to learn more about \"A Healthy Lifestyle: Care Instructions. \" Current as of: May 12, 2017 Content Version: 11.4 © 4690-4897 [employer]. Care instructions adapted under license by [employer] (which disclaims liability or warranty for this information). If you have questions about a medical condition or this instruction, always ask your healthcare professional. [place] [address] any warranty or liability for your use of this information. Introducing [facility]! [facility] introduces [employer] patient portal. Now you can access parts of your medical record, email your doctor's office, and request medication refills online. 1. In your internet browser, go to [email or web address] 2. Click on the First Time User? Click Here link in the Sign In box. You will see the New Member Sign Up page. 3. Enter your Nautit Access Code exactly as it appears below. You will not need to use this code after youve completed the sign-up process.  If you do not sign up before the expiration date, you must request a new code. · Boostable Access Code: [email or web address] Expires: 3/26/2018  3:04 PM 
 
4. Enter the last four digits of your Social Security Number (xxxx) and Date of Birth (mm/dd/yyyy) as indicated and click Submit. You will be taken to the next sign-up page. 5. Create a Boostable ID. This will be your Boostable login ID and cannot be changed, so think of one that is secure and easy to remember. 6. Create a Boostable password. You can change your password at any time. 7. Enter your Password Reset Question and Answer. This can be used at a later time if you forget your password. 8. Enter your e-mail address. You will receive e-mail notification when new information is available in [address] 9. Click Sign Up. You can now view and download portions of your medical record. 10. Click the Download Summary menu link to download a portable copy of your medical information. If you have questions, please visit the Frequently Asked Questions section of the Boostable website. Remember, Boostable is NOT to be used for urgent needs. For medical emergencies, dial 911. Now available from your iPhone and Android! Please provide this summary of care documentation to your next provider. Your primary care clinician is listed as Ian Jorgensen. If you have any questions after today's visit, please call [phone].

## 2018-01-30 ENCOUNTER — OFFICE VISIT (OUTPATIENT)
Dept: OBGYN CLINIC | Age: 63
End: 2018-01-30

## 2018-01-30 VITALS
HEIGHT: 64 IN | BODY MASS INDEX: 29.23 KG/M2 | RESPIRATION RATE: 16 BRPM | DIASTOLIC BLOOD PRESSURE: 82 MMHG | SYSTOLIC BLOOD PRESSURE: 102 MMHG | WEIGHT: 171.2 LBS

## 2018-01-30 DIAGNOSIS — Z01.419 ENCOUNTER FOR GYNECOLOGICAL EXAMINATION (GENERAL) (ROUTINE) WITHOUT ABNORMAL FINDINGS: Primary | ICD-10-CM

## 2018-01-30 RX ORDER — IBUPROFEN 800 MG/1
TABLET ORAL
COMMUNITY

## 2018-01-30 RX ORDER — BUSPIRONE HYDROCHLORIDE 5 MG/1
TABLET ORAL 2 TIMES DAILY
COMMUNITY

## 2018-01-30 RX ORDER — GABAPENTIN 100 MG/1
CAPSULE ORAL 3 TIMES DAILY
COMMUNITY
End: 2018-06-26 | Stop reason: SDUPTHER

## 2018-01-30 NOTE — PROGRESS NOTES
B       Annual Gynecologic Exam:   WWE 60+  Chief Complaint   Patient presents with    [place] Woman         Oracio Farris is a 60 y.o. female who presents for an annual exam.    She does not report additional concerns today. Sexual history and Contraception:  History   Sexual Activity    Sexual activity: Not on file     She no reports new sexual partner(s) in the last year. Preventive Medicine History:  Her most recent Pap smear result: was obtained one year ago. patient reported    She doesn't have a history of JOZEF 2, 3 or cervical cancer. Breast health:  Last mammogram: last year patient reported   A mammogram was not scheduled for today. Breast cancer family updated: see . Bone health: Ayde Vernon She has had a bone density scan in the past.   Last bone density test results:   She does have a history of osteopenia/osteoporosis. Osteoporosis family history updated: see .      Past Medical History:   Diagnosis Date    Anxiety disorder     Arthritis     Bulging disc     Constipation     COPD     Cough     Depression     Diarrhea     Falls     Fatigue     Fibromyalgia     Frequent headaches     GERD (gastroesophageal reflux disease)     Hearing loss     Hypercholesteremia     Hypothyroidism     Incontinence     Joint pain     Leg swelling     Memory disorder     Muscle pain     Muscle weakness     Nausea and vomiting     Neuropathy     Osteopenia     Other ill-defined conditions(689.89)     fibromyalgia  osteopenia    Psychiatric disorder     Snoring     SOB (shortness of breath)     Stomach pain     Swallowing difficulty     Thyroid disease     hypothyroidism    Unspecified sleep apnea     Visual disturbance      Past Surgical History:   Procedure Laterality Date    HX GYN       Family History   Problem Relation Age of Onset    Cancer Paternal Grandmother      breast    Emphysema Paternal Grandmother      Social History     Social History    Marital status: UNKNOWN     Spouse name: N/A    Number of children: N/A    Years of education: N/A     Occupational History    Not on file. Social History Main Topics    Smoking status: Current Every Day Smoker     Packs/day: 1.50     Years: 42.00    Smokeless tobacco: Never Used    Alcohol use No    Drug use: Not on file    Sexual activity: Not on file     Other Topics Concern    Not on file     Social History Narrative       No Known Allergies    Current Outpatient Prescriptions   Medication Sig    ibuprofen (MOTRIN) 800 mg tablet Take  by mouth.  gabapentin (NEURONTIN) 100 mg capsule Take  by mouth three (3) times daily.  busPIRone (BUSPAR) 5 mg tablet Take  by mouth.  umeclidinium-vilanterol (ANORO ELLIPTA) 62.5-25 mcg/actuation inhaler Take 1 Puff by inhalation daily.  predniSONE (DELTASONE) 20 mg tablet Take 1 Tab by mouth as needed.  codeine-butalbital-acetaminophen-caffeine (FIORICET WITH CODEINE) -52-30 mg per capsule Take 1 Cap by mouth every four (4) hours as needed for Headache. Max Daily Amount: 6 Caps.  buPROPion XL (WELLBUTRIN XL) 300 mg XL tablet TK 1 T PO D    levothyroxine (SYNTHROID) 88 mcg tablet TK 1 T PO QD    lisinopril (PRINIVIL, ZESTRIL) 10 mg tablet TK 1 T PO QD    QUEtiapine (SEROQUEL) 100 mg tablet Take 100 mg by mouth nightly.  ASPIRIN/SALICYLAMIDE/CAFFEINE (BC HEADACHE POWDER PO) Take  by mouth as needed.  baclofen (LIORESAL) 10 mg tablet TAKE 1 TABLET BY MOUTH TWICE DAILY    albuterol (PROVENTIL HFA, VENTOLIN HFA) 90 mcg/Actuation inhaler Take 2-3 Puffs by inhalation every six (6) hours as needed.  ipratropium (ATROVENT HFA) 17 mcg/Actuation inhaler Take 2-3 Puffs by inhalation every six (6) hours as needed.  albuterol-ipratropium (DUO-NEB) 0.5 mg-3 mg(2.5 mg base)/3 mL nebulizer solution 3 mL by Nebulization route every four (4) hours.  simvastatin (ZOCOR) 40 mg tablet Take 40 mg by mouth nightly.     diazepam (VALIUM) 5 mg tablet Take 5 mg by mouth daily as needed.  calcium-cholecalciferol, D3, (CALCIUM WITH VITAMIN D) tablet Take 1 Tab by mouth two (2) times a day.  lidocaine (LIDODERM) 5 % 1 Patch by TransDERmal route every twenty-four (24) hours.  traMADol (ULTRAM) 50 mg tablet Take 1 Tab by mouth every six (6) hours as needed for Pain. Max Daily Amount: 200 mg.    gabapentin (NEURONTIN) 400 mg capsule Take 1 Cap by mouth three (3) times daily.  SUMAtriptan (IMITREX) 100 mg tablet Take 1 Tab by mouth once as needed for Migraine for up to 1 dose.  ergocalciferol (ERGOCALCIFEROL) 50,000 unit capsule TK 1 C PO TWICE A WEEK  AS DIRECTED    FLUVIRIN [id number] susp injection ADM 0.5ML IM UTD    METHYL SALICYLATE/MENTHOL (ICY HOT EX) by Apply Externally route daily.  SUMAtriptan (IMITREX) 100 mg tablet 1 tab at onset of moderate-severe migraine; may repeat 1 tab in 2 hours; Limit: 2 tabs in 24/ hrs, not more than 3 days a week    oxyCODONE-acetaminophen (PERCOCET 10)  mg per tablet Take 1 Tab by mouth every four (4) hours as needed for Pain.  ibuprofen (MOTRIN) 400 mg tablet Take 1 Tab by mouth every eight (8) hours as needed for Pain. (Patient taking differently: Take 800 mg by mouth every eight (8) hours as needed for Pain.)    venlafaxine (EFFEXOR) 75 mg tablet Take 25 mg by mouth two (2) times a day.  tiotropium (SPIRIVA WITH HANDIHALER) 18 mcg inhalation capsule Take 1 Cap by inhalation daily. Indications: CHRONIC OBSTRUCTIVE PULMONARY DISEASE WITH BRONCHOSPASMS    ibandronate (BONIVA) 150 mg tablet Take 150 mg by mouth every thirty (30) days.  OTHER,NON-FORMULARY, Take 80 mg by mouth as needed. \"Asacolec\"   Indications: CONSTIPATION     No current facility-administered medications for this visit.         Patient Active Problem List   Diagnosis Code    Recurrent depression ([facility]UST[facility] 75.) F33.9       Review of Systems - History obtained from the patient  Constitutional: negative for weight loss, fever, night sweats  HEENT: negative for hearing loss, earache, congestion, snoring, sorethroat  CV: negative for chest pain, palpitations, edema  Resp: negative for cough, shortness of breath, wheezing  GI: negative for change in bowel habits, abdominal pain, black or bloody stools  : negative for frequency, dysuria, hematuria, vaginal discharge  MSK: negative for back pain, joint pain, muscle pain  Breast: negative for breast lumps, nipple discharge, galactorrhea  Skin :negative for itching, rash, hives  Neuro: negative for dizziness, headache, confusion, weakness  Psych: negative for anxiety, depression, change in mood  Heme/lymph: negative for bleeding, bruising, pallor    Physical Exam  Visit Vitals    /82 (BP 1 Location: Left arm, BP Patient Position: Sitting)    Resp 16    Ht 5' 4\" (1.626 m)    Wt 171 lb 3.2 oz (77.7 kg)    BMI 29.39 kg/m2       Constitutional  · Appearance: well-nourished, well developed, alert, in no acute distress    HENT  · Head and Face: appears normal    Neck  · Inspection/Palpation: normal appearance, no masses or tenderness  · Lymph Nodes: no lymphadenopathy present  · Thyroid: gland size normal, nontender, no nodules or masses present on palpation    Chest  · Respiratory Effort: breathing unlabored  · Auscultation: normal breath sounds    Cardiovascular  · Heart:  · Auscultation: regular rate and rhythm without murmur    Breasts  · Inspection of Breasts: breasts symmetrical, no skin changes, no discharge present, nipple appearance normal, no skin retraction present  · Palpation of Breasts and Axillae: no masses present on palpation, no breast tenderness  · Axillary Lymph Nodes: no lymphadenopathy present    Gastrointestinal  · Abdominal Examination: abdomen non-tender to palpation, normal bowel sounds, no masses present  · Liver and spleen: no hepatomegaly present, spleen not palpable  · Hernias: no hernias identified    Genitourinary  · External Genitalia: normal appearance for age, no discharge present, no tenderness present, no inflammatory lesions present, no masses present, with atrophy present  · Vagina: normal vaginal vault without central or paravaginal defects, no discharge present, no inflammatory lesions present, no masses present  · Bladder: non-tender to palpation  · Urethra: appears normal  · Cervix: normal   · Uterus: normal size, shape and consistency  · Adnexa: no adnexal tenderness present, no adnexal masses present  · Perineum: perineum within normal limits, no evidence of trauma, no rashes or skin lesions present  · Anus: anus within normal limits, no hemorrhoids present  · Inguinal Lymph Nodes: no lymphadenopathy present    Skin  · General Inspection: no rash, no lesions identified    Neurologic/Psychiatric  · Mental Status:  · Orientation: grossly oriented to person, place and time  · Mood and Affect: mood normal, affect appropriate    Assessment:  60 y.o. No obstetric history on file. for well woman exam  No diagnosis found. Plan:  The patient was counseled about diet, exercise, healthy lifestyle  Mamm this year as scheduled. Pap and STI testing sent today. We recommend follow up in one year for routine annual gynecologic exam or sooner if needed  We recommend follow up with a primary care physician for any chronic medical problems or non-gynecologic concerns    We discussed calcium/vitamin D/weight bearing exercise and osteoporosis prevention and bone density screening recommendations.   Handouts were given to the patient       Folllow up:  [x] return for annual well woman exam in one year or sooner if she is having problems  [] follow up and ultrasound  [] mammogram  [] 6 months  [] 6 weeks   []     Signed By: Tova Wilks MD     January 30, 2018

## 2018-02-02 LAB
C TRACH RRNA CVX QL NAA+PROBE: NEGATIVE
CYTOLOGIST CVX/VAG CYTO: NORMAL
CYTOLOGY CVX/VAG DOC THIN PREP: NORMAL
DX ICD CODE: NORMAL
LABCORP, 190119: NORMAL
Lab: NORMAL
N GONORRHOEA RRNA CVX QL NAA+PROBE: NEGATIVE
OTHER STN SPEC: NORMAL
PATH REPORT.FINAL DX SPEC: NORMAL
STAT OF ADQ CVX/VAG CYTO-IMP: NORMAL
T VAGINALIS RRNA SPEC QL NAA+PROBE: NEGATIVE

## 2018-03-15 DIAGNOSIS — Z76.0 MEDICATION REFILL: Primary | ICD-10-CM

## 2018-03-15 RX ORDER — BACLOFEN 10 MG/1
TABLET ORAL
Qty: 180 TAB | Refills: 0 | Status: SHIPPED | OUTPATIENT
Start: 2018-03-15 | End: 2018-03-26 | Stop reason: SDUPTHER

## 2018-03-24 RX ORDER — BACLOFEN 10 MG/1
TABLET ORAL
Qty: 180 TAB | Refills: 0 | Status: SHIPPED | OUTPATIENT
Start: 2018-03-24 | End: 2018-06-26 | Stop reason: SDUPTHER

## 2018-03-26 ENCOUNTER — OFFICE VISIT (OUTPATIENT)
Dept: NEUROLOGY | Age: 63
End: 2018-03-26

## 2018-03-26 VITALS
RESPIRATION RATE: 18 BRPM | WEIGHT: 173.6 LBS | DIASTOLIC BLOOD PRESSURE: 60 MMHG | HEIGHT: 64 IN | SYSTOLIC BLOOD PRESSURE: 108 MMHG | BODY MASS INDEX: 29.64 KG/M2 | TEMPERATURE: 97.7 F | OXYGEN SATURATION: 97 % | HEART RATE: 71 BPM

## 2018-03-26 DIAGNOSIS — G43.019 INTRACTABLE MIGRAINE WITHOUT AURA AND WITHOUT STATUS MIGRAINOSUS: Primary | ICD-10-CM

## 2018-03-26 DIAGNOSIS — G43.009 MIGRAINE WITHOUT AURA AND WITHOUT STATUS MIGRAINOSUS, NOT INTRACTABLE: ICD-10-CM

## 2018-03-26 DIAGNOSIS — R26.9 GAIT DISORDER: ICD-10-CM

## 2018-03-26 DIAGNOSIS — G62.9 NEUROPATHY: ICD-10-CM

## 2018-03-26 DIAGNOSIS — M54.50 CHRONIC BILATERAL LOW BACK PAIN WITHOUT SCIATICA: ICD-10-CM

## 2018-03-26 DIAGNOSIS — M79.18 MYOFASCIAL MUSCLE PAIN: ICD-10-CM

## 2018-03-26 DIAGNOSIS — G89.29 CHRONIC BILATERAL LOW BACK PAIN WITHOUT SCIATICA: ICD-10-CM

## 2018-03-26 RX ORDER — ROFLUMILAST 500 UG/1
TABLET ORAL DAILY
COMMUNITY
Start: 2018-03-26

## 2018-03-26 RX ORDER — BUSPIRONE HYDROCHLORIDE 10 MG/1
TABLET ORAL
Refills: 0 | COMMUNITY
Start: 2018-03-12 | End: 2018-03-26 | Stop reason: SDUPTHER

## 2018-03-26 RX ORDER — LAMOTRIGINE 25 MG/1
TABLET ORAL
Refills: 0 | COMMUNITY
Start: 2018-03-12 | End: 2021-10-24

## 2018-03-26 RX ORDER — TRAMADOL HYDROCHLORIDE 50 MG/1
50 TABLET ORAL
Qty: 40 TAB | Refills: 3 | Status: SHIPPED | OUTPATIENT
Start: 2018-03-26 | End: 2018-06-26 | Stop reason: SDUPTHER

## 2018-03-26 RX ORDER — CYANOCOBALAMIN (VITAMIN B-12) 500 MCG
TABLET ORAL
Refills: 5 | COMMUNITY
Start: 2018-03-15

## 2018-03-26 NOTE — PROGRESS NOTES
Neurology Progress Note    NAME:  Maria Vieira   :   1955   MRN:   [id number]     Date/Time:  3/26/2018  Subjective:      Maria Vieira is a 60 y.o. female here today for follow up for headache, pain  Headache is throbbing in nature , its frequency is variable, mostly frontal, aggravated by stress,it is associated with dizziness, nausea, photophobia, phonophobia, medication helps if it is caught on time. , headache sometimes wakes patient from sleep. Pain is sharp in nature, generalized ,aggravated by activity, , there is associated muscle spasm. Review of Systems - General ROS: positive for  - fatigue and sleep disturbance  Psychological ROS: positive for - anxiety, concentration difficulties and sleep disturbances  Ophthalmic ROS: positive for - blurry vision, decreased vision and photophobia  ENT ROS: positive for - headaches, tinnitus and visual changes  Allergy and Immunology ROS: negative  Hematological and Lymphatic ROS: negative  Endocrine ROS: negative  Breast ROS: negative for breast lumps  Respiratory ROS: no cough, shortness of breath, or wheezing  Cardiovascular ROS: no chest pain or dyspnea on exertion  Gastrointestinal ROS: no abdominal pain, change in bowel habits, or black or bloody stools  Genito-Urinary ROS: no dysuria, trouble voiding, or hematuria  Musculoskeletal ROS: positive for - joint pain, joint stiffness, muscle pain and muscular weakness  Neurological ROS: positive for - dizziness, headaches, numbness/tingling, visual changes and weakness  Dermatological ROS: negative      Medications reviewed:  Current Outpatient Prescriptions   Medication Sig Dispense Refill    VITAMIN B-12 500 mcg tablet TK 1 T PO QD  5    DALIRESP tab tablet daily.  baclofen (LIORESAL) 10 mg tablet TAKE 1 TABLET BY MOUTH TWICE DAILY 180 Tab 0    ibuprofen (MOTRIN) 800 mg tablet Take  by mouth.  gabapentin (NEURONTIN) 100 mg capsule Take  by mouth three (3) times daily.       busPIRone (BUSPAR) 5 mg tablet Take  by mouth two (2) times a day.  umeclidinium-vilanterol (ANORO ELLIPTA) 62.5-25 mcg/actuation inhaler Take 1 Puff by inhalation daily.  lidocaine (LIDODERM) 5 % 1 Patch by TransDERmal route every twenty-four (24) hours. 30 Each 3    traMADol (ULTRAM) 50 mg tablet Take 1 Tab by mouth every six (6) hours as needed for Pain. Max Daily Amount: 200 mg. 40 Tab 1    predniSONE (DELTASONE) 20 mg tablet Take 1 Tab by mouth as needed. 20 Tab 1    buPROPion XL (WELLBUTRIN XL) 300 mg XL tablet TK 1 T PO QD  2    levothyroxine (SYNTHROID) 88 mcg tablet TK 1 T PO QD  2    lisinopril (PRINIVIL, ZESTRIL) 10 mg tablet TK 1 T PO QD  5    QUEtiapine (SEROQUEL) 100 mg tablet Take 100 mg by mouth nightly.  ASPIRIN/SALICYLAMIDE/CAFFEINE (BC HEADACHE POWDER PO) Take  by mouth as needed.  METHYL SALICYLATE/MENTHOL (ICY HOT EX) by Apply Externally route daily.  albuterol (PROVENTIL HFA, VENTOLIN HFA) 90 mcg/Actuation inhaler Take 2-3 Puffs by inhalation every six (6) hours as needed.  albuterol-ipratropium (DUO-NEB) 0.5 mg-3 mg(2.5 mg base)/3 mL nebulizer solution 3 mL by Nebulization route every four (4) hours.  simvastatin (ZOCOR) 40 mg tablet Take 40 mg by mouth nightly.  diazepam (VALIUM) 5 mg tablet Take 5 mg by mouth daily as needed.  lamoTRIgine (LAMICTAL) 25 mg tablet TK ONE TABLET PO D FOR 2 WEEKS THEN INCREASE TO ONE TABLET BID  0    codeine-butalbital-acetaminophen-caffeine (FIORICET WITH CODEINE) -10-30 mg per capsule Take 1 Cap by mouth every four (4) hours as needed for Headache. Max Daily Amount: 6 Caps.  40 Cap 1    ergocalciferol (ERGOCALCIFEROL) 50,000 unit capsule TK 1 C PO TWICE A WEEK  AS DIRECTED  3    FLUVIRIN [id number] susp injection ADM 0.5ML IM UTD  0    SUMAtriptan (IMITREX) 100 mg tablet 1 tab at onset of moderate-severe migraine; may repeat 1 tab in 2 hours; Limit: 2 tabs in 24/ hrs, not more than 3 days a week 12 Tab 3    oxyCODONE-acetaminophen (PERCOCET 10)  mg per tablet Take 1 Tab by mouth every four (4) hours as needed for Pain. 60 Tab 0    venlafaxine (EFFEXOR) 75 mg tablet Take 25 mg by mouth two (2) times a day.  tiotropium (SPIRIVA WITH HANDIHALER) 18 mcg inhalation capsule Take 1 Cap by inhalation daily. Indications: CHRONIC OBSTRUCTIVE PULMONARY DISEASE WITH BRONCHOSPASMS      ipratropium (ATROVENT HFA) 17 mcg/Actuation inhaler Take 2-3 Puffs by inhalation every six (6) hours as needed.  ibandronate (BONIVA) 150 mg tablet Take 150 mg by mouth every thirty (30) days.  OTHER,NON-FORMULARY, Take 80 mg by mouth as needed. \"Asacolec\"   Indications: CONSTIPATION      calcium-cholecalciferol, D3, (CALCIUM WITH VITAMIN D) tablet Take 1 Tab by mouth two (2) times a day.           Objective:   Vitals:  Vitals:    03/26/18 1458   BP: 108/60   Pulse: 71   Resp: 18   Temp: 97.7 °F (36.5 °C)   TempSrc: Oral   SpO2: 97%   Weight: 173 lb 9.6 oz (78.7 kg)   Height: 5' 4\" (1.626 m)   PainSc:   7   PainLoc: Generalized               Lab Data Reviewed:  Lab Results   Component Value Date/Time    WBC 6.5 04/13/2012 04:50 PM    HCT 42.0 04/13/2012 04:50 PM    HGB 14.5 04/13/2012 04:50 PM    PLATELET 851 99/99/3626 04:50 PM       Lab Results   Component Value Date/Time    Sodium 142 04/13/2012 04:50 PM    Potassium 3.4 (L) 04/13/2012 04:50 PM    Chloride 110 (H) 04/13/2012 04:50 PM    CO2 20 (L) 04/13/2012 04:50 PM    Glucose 171 (H) 04/13/2012 04:50 PM    BUN 10 04/13/2012 04:50 PM    Creatinine 0.6 04/13/2012 04:50 PM    Calcium 8.5 04/13/2012 04:50 PM       No components found for: TROPQUANT    No results found for: MOE      No results found for: HBA1C, HGBE8, EYE9SYOM, CCG6VYZI     Lab Results   Component Value Date/Time    Vitamin B12 288 09/25/2017 12:00 PM       No results found for: MOE, ANARX, ANAIGG, XBANA    No results found for: CHOL, CHOLPOCT, CHOLX, CHLST, CHOLV, HDL, HDLPOC, LDL, LDLCPOC, LDLC, DLDLP, VLDLC, VLDL, TGLX, TRIGL, TRIGP, TGLPOCT, CHHD, CHHDX      CT Results (recent): MRI Results (recent):    Results from [place] encounter on 10/04/17   MRI BRAIN W WO CONT   Narrative EXAM:  MRI BRAIN W WO CONT    INDICATION:    dizziness, frequent fall    COMPARISON:  None. CONTRAST: 10 ml ProHance    TECHNIQUE:    MR imaging of the brain was performed with sagittal T1, axial T1, T2, FLAIR,  GRE, DWI/ADC; multiplanar T1 images prior to and following uneventful  intravenous contrast administration. FINDINGS: The ventricular size and configuration are normal. There is mild to  moderate periventricular and scattered subcortical white matter disease. There  is no evidence of mass, hemorrhage, acute infarct or abnormal extra-axial fluid  collection. Normal appearing flow-voids are present in the vertebral, basilar  and carotid artery systems. The craniocervical junction is normal.  The  structures at the cranial base including paranasal sinuses and mastoid air cells  are unremarkable. There is no abnormal parenchymal or meningeal enhancement. Impression IMPRESSION:  Mild to moderate white matter disease likely to chronic small  vessel ischemic disease. No evidence of acute process. IR Results (recent):    Results from [place] encounter on 10/28/11   IR ARTHROGRAM SI JOINT   Narrative **Final Report**      ICD Codes / Adm. Diagnosis: 719.45  724.2 / PAIN IN JOINT, [place] REGION A    LUMBAGO  Examination:  XA ARTHROGRAM SI JOINT  - [id number] - Oct 28 2011 10:28AM  Accession No:  [id number]  Reason:  lumbago      REPORT:  HISTORY: Back pain. PROCEDURE: Informed consent was obtained. Risks of bleeding, infection,   allergy, leg weakness, elevation of blood sugar, steroid flare, low blood   pressure, etc., were explained and understood. MRI was reviewed.  Following   sterile prep and local anesthesia, percutaneous placement of a 22-gauge   needle into the collateral sacroiliac joints was performed via posterior   approach under fluoroscopic biplane imaging control and contrast monitoring. 120 mg of Depo-Medrol and 4 mL of Sensorcaine were slowly injected during   needle was removed and dressing placed. No complications are evident. FINDINGS: Needle tip lies within the joint and contrast opacifies the joint   and the adjacent soft tissues. IMPRESSION:    Successful bilateral sacroiliac joint injections. Signing/Reading Doctor: Sung Conway ([id number])    Approved: Sung Conway ([id number])  10/28/2011                                      VAS/US Results (recent):  No results found for this or any previous visit. PHYSICAL EXAM:  General:    Alert, cooperative, no distress, appears stated age. Head:   Normocephalic, without obvious abnormality, atraumatic. Eyes:   Conjunctivae/corneas clear. PERRLA  Nose:  Nares normal. No drainage or sinus tenderness. Throat:    Lips, mucosa, and tongue normal.  No Thrush  Neck:  Supple, symmetrical,  no adenopathy, thyroid: non tender    no carotid bruit and no JVD. Back:    Symmetric,  No CVA tenderness. Lungs:   Clear to auscultation bilaterally. No Wheezing or Rhonchi. No rales. Chest wall:  No tenderness or deformity. No Accessory muscle use. Heart:   Regular rate and rhythm,  no murmur, rub or gallop. Abdomen:   Soft, non-tender. Not distended. Bowel sounds normal. No masses  Extremities: Extremities normal, atraumatic, No cyanosis. No edema. No clubbing  Skin:     Texture, turgor normal. No rashes or lesions. Not Jaundiced  Lymph nodes: Cervical, supraclavicular normal.  Psych:  Good insight. Not depressed. Not anxious or agitated. NEUROLOGICAL EXAM:  Appearance: The patient is well developed, well nourished, provides a coherent history and is in no acute distress. Mental Status: Oriented to time, place and person. Mood and affect appropriate. Cranial Nerves:   Intact visual fields. Fundi are benign. JOSE, EOM's full, no nystagmus, no ptosis. Facial sensation is normal. Corneal reflexes are intact. Facial movement is symmetric. Hearing is normal bilaterally. Palate is midline with normal sternocleidomastoid and trapezius muscles are normal. Tongue is midline. Motor:  5/5 strength in upper and lower proximal and distal muscles. Normal bulk and tone. No fasciculations. Reflexes:   Deep tendon reflexes 2+/4 and symmetrical.   Sensory:   Dysesthesia to touch, pinprick and vibration. Gait:  Normal gait. Tremor:   No tremor noted. Cerebellar:  No cerebellar signs present. Neurovascular:  Normal heart sounds and regular rhythm, peripheral pulses intact, and no carotid bruits. Assesment  1. Intractable migraine without aura and without status migrainosus  Continue management    2. Myofascial muscle pain  Continue management    3. Gait disorder  Physical therapy    4. Neuropathy  Stable    ___________________________________________________  PLAN:Medication reviewed with patient      ICD-10-CM ICD-9-CM    1. Intractable migraine without aura and without status migrainosus G43.019 346.11    2. Myofascial muscle pain M79.1 729.1    3. Gait disorder R26.9 781.2    4. Neuropathy G62.9 355.9      Follow-up Disposition:  Return in about 3 months (around 6/26/2018).          ___________________________________________________    Total time spent with patient:  []15   []25   []35   [] __ minutes    Care Plan discussed with:    [x]Patient   []Family    []Care Manager   []Consultant/Specialist :    ___________________________________________________    Attending Physician: Johana Cameron MD

## 2018-03-26 NOTE — MR AVS SNAPSHOT
Jeannie Layton 65 [address] [place] [address] 
[phone] Patient: Maria Vieira MRN: [id number] :1955 Visit Information Date & Time Provider Department Dept. Phone Encounter #  
 3/26/2018  2:40 PM Louis Agustin MD [facility] [phone] [id number] Follow-up Instructions Return in about 3 months (around 2018). Upcoming Health Maintenance Date Due Hepatitis C Screening 1955 Pneumococcal 19-64 Medium Risk (1 of 1 - PPSV23) 10/28/1973 DTaP/Tdap/Td series (1 - Tdap) 10/28/1975 BREAST CANCER SCRN MAMMOGRAM 10/28/2004 FOBT Q 1 YEAR AGE 50-75 10/28/2004 ZOSTER VACCINE AGE 60> 2014 Influenza Age 5 to Adult 2017 MEDICARE YEARLY EXAM 3/14/2018 PAP AKA CERVICAL CYTOLOGY 2021 Allergies as of 3/26/2018  Review Complete On: 3/26/2018 By: Bonnie Martin MD  
 No Known Allergies Current Immunizations  Never Reviewed No immunizations on file. Not reviewed this visit You Were Diagnosed With   
  
 Codes Comments Intractable migraine without aura and without status migrainosus    -  Primary ICD-10-CM: G43.019 
ICD-9-CM: 346.11 Myofascial muscle pain     ICD-10-CM: M79.1 ICD-9-CM: 729.1 Gait disorder     ICD-10-CM: R26.9 ICD-9-CM: 781.2 Neuropathy     ICD-10-CM: G62.9 ICD-9-CM: 355.9 Migraine without aura and without status migrainosus, not intractable     ICD-10-CM: O42.136 ICD-9-CM: 346.10 Chronic bilateral low back pain without sciatica     ICD-10-CM: M54.5, G89.29 ICD-9-CM: 724.2, 338.29 Vitals BP Pulse Temp Resp Height(growth percentile) Weight(growth percentile) 108/60 (BP 1 Location: Right arm, BP Patient Position: Sitting) 71 97.7 °F (36.5 °C) (Oral) 18 5' 4\" (1.626 m) 173 lb 9.6 oz (78.7 kg) SpO2 BMI OB Status Smoking Status 97% 29.8 kg/m2 Postmenopausal Current Every Day Smoker BMI and BSA Data Body Mass Index Body Surface Area  
 29.8 kg/m 2 1.89 m 2 Preferred Pharmacy Pharmacy Name Phone [facility], [address] [phone] Your Updated Medication List  
  
   
This list is accurate as of 3/26/18  3:42 PM.  Always use your most recent med list.  
  
  
  
  
 albuterol 90 mcg/actuation inhaler Commonly known as:  PROVENTIL HFA, VENTOLIN HFA, PROAIR HFA Take 2-3 Puffs by inhalation every six (6) hours as needed. albuterol-ipratropium 2.5 mg-0.5 mg/3 ml Nebu Commonly known as:  DUO-NEB  
3 mL by Nebulization route every four (4) hours. ANORO ELLIPTA 62.5-25 mcg/actuation inhaler Generic drug:  umeclidinium-vilanterol Take 1 Puff by inhalation daily. ATROVENT HFA 17 mcg/actuation inhaler Generic drug:  ipratropium Take 2-3 Puffs by inhalation every six (6) hours as needed. baclofen 10 mg tablet Commonly known as:  LIORESAL  
TAKE 1 TABLET BY MOUTH TWICE DAILY  
  
 BC HEADACHE POWDER PO Take  by mouth as needed. BONIVA 150 mg tablet Generic drug:  ibandronate Take 150 mg by mouth every thirty (30) days. buPROPion  mg XL tablet Commonly known as:  WELLBUTRIN XL  
TK 1 T PO QD  
  
 busPIRone 5 mg tablet Commonly known as:  BUSPAR Take  by mouth two (2) times a day. CALCIUM WITH VITAMIN D tablet Generic drug:  calcium-cholecalciferol (D3) Take 1 Tab by mouth two (2) times a day. codeine-butalbital-acetaminophen-caffeine -92-30 mg capsule Commonly known as:  FIORICET WITH CODEINE Take 1 Cap by mouth every four (4) hours as needed for Headache. Max Daily Amount: 6 Caps. DALIRESP Tab tablet Generic drug:  roflumilast  
daily. diazePAM 5 mg tablet Commonly known as:  VALIUM Take 5 mg by mouth daily as needed. ergocalciferol 50,000 unit capsule Commonly known as:  ERGOCALCIFEROL TK 1 C PO TWICE A WEEK  AS DIRECTED  
  
 FLUVIRIN 6284-1419 Susp injection Generic drug:  influenza vaccine 2017-18 (4 yrs+) ADM 0.5ML IM UTD  
  
 gabapentin 100 mg capsule Commonly known as:  NEURONTIN Take  by mouth three (3) times daily. ibuprofen 800 mg tablet Commonly known as:  MOTRIN Take  by mouth. ICY HOT EX  
by Apply Externally route daily. lamoTRIgine 25 mg tablet Commonly known as: LaMICtal  
TK ONE TABLET PO D FOR 2 WEEKS THEN INCREASE TO ONE TABLET BID  
  
 levothyroxine 88 mcg tablet Commonly known as:  SYNTHROID TK 1 T PO QD  
  
 lidocaine 5 % Commonly known as:  LIDODERM  
1 Patch by TransDERmal route every twenty-four (24) hours. lisinopril 10 mg tablet Commonly known as:  Karley Dowdy TK 1 T PO QD  
  
 OTHER(NON-FORMULARY) Take 80 mg by mouth as needed. \"Asacolec\"   Indications: CONSTIPATION  
  
 oxyCODONE-acetaminophen  mg per tablet Commonly known as:  PERCOCET 10 Take 1 Tab by mouth every four (4) hours as needed for Pain. predniSONE 20 mg tablet Commonly known as:  Cecillia [place] Take 1 Tab by mouth as needed. QUEtiapine 100 mg tablet Commonly known as:  SEROquel Take 100 mg by mouth nightly. simvastatin 40 mg tablet Commonly known as:  ZOCOR Take 40 mg by mouth nightly. SPIRIVA WITH HANDIHALER 18 mcg inhalation capsule Generic drug:  tiotropium Take 1 Cap by inhalation daily. Indications: CHRONIC OBSTRUCTIVE PULMONARY DISEASE WITH BRONCHOSPASMS  
  
 SUMAtriptan 100 mg tablet Commonly known as:  IMITREX  
1 tab at onset of moderate-severe migraine; may repeat 1 tab in 2 hours; Limit: 2 tabs in 24/ hrs, not more than 3 days a week  
  
 traMADol 50 mg tablet Commonly known as:  ULTRAM  
Take 1 Tab by mouth every six (6) hours as needed for Pain. Max Daily Amount: 200 mg.  
  
 venlafaxine 75 mg tablet Commonly known as:  [facility] Take 25 mg by mouth two (2) times a day. VITAMIN B-12 500 mcg tablet Generic drug:  cyanocobalamin TK 1 T PO QD Prescriptions Printed Refills  
 traMADol (ULTRAM) 50 mg tablet 3 Sig: Take 1 Tab by mouth every six (6) hours as needed for Pain. Max Daily Amount: 200 mg. Class: Print Route: Oral  
  
Follow-up Instructions Return in about 3 months (around 6/26/2018). Introducing [facility]! [employer] introduces HeySpace patient portal. Now you can access parts of your medical record, email your doctor's office, and request medication refills online. 1. In your internet browser, go to [email or web address] 2. Click on the First Time User? Click Here link in the Sign In box. You will see the New Member Sign Up page. 3. Enter your HeySpace Access Code exactly as it appears below. You will not need to use this code after youve completed the sign-up process. If you do not sign up before the expiration date, you must request a new code. · HeySpace Access Code: [email or web address] Expires: 3/26/2018  4:04 PM 
 
4. Enter the last four digits of your Social Security Number (xxxx) and Date of Birth (mm/dd/yyyy) as indicated and click Submit. You will be taken to the next sign-up page. 5. Create a HeySpace ID. This will be your HeySpace login ID and cannot be changed, so think of one that is secure and easy to remember. 6. Create a HeySpace password. You can change your password at any time. 7. Enter your Password Reset Question and Answer. This can be used at a later time if you forget your password. 8. Enter your e-mail address. You will receive e-mail notification when new information is available in [address] 9. Click Sign Up. You can now view and download portions of your medical record. 10. Click the Download Summary menu link to download a portable copy of your medical information. If you have questions, please visit the Frequently Asked Questions section of the Brows[employer] website. Remember, eROI is NOT to be used for urgent needs. For medical emergencies, dial 911. Now available from your iPhone and Android! Please provide this summary of care documentation to your next provider. Your primary care clinician is listed as Silvina Jama. If you have any questions after today's visit, please call [phone].

## 2018-03-26 NOTE — PROGRESS NOTES
Chief Complaint   Patient presents with    Migraine     1. Have you been to the ER, urgent care clinic since your last visit? Hospitalized since your last visit? No    2. Have you seen or consulted any other health care providers outside of the [address] [place] [address] since your last visit? Include any pap smears or colon screening.  No

## 2018-06-19 DIAGNOSIS — Z76.0 MEDICATION REFILL: Primary | ICD-10-CM

## 2018-06-19 RX ORDER — GABAPENTIN 400 MG/1
CAPSULE ORAL
Refills: 2 | COMMUNITY
Start: 2018-05-30 | End: 2018-06-19 | Stop reason: SDUPTHER

## 2018-06-19 RX ORDER — GABAPENTIN 400 MG/1
400 CAPSULE ORAL 3 TIMES DAILY
Qty: 270 CAP | Refills: 0 | Status: SHIPPED | OUTPATIENT
Start: 2018-06-19 | End: 2018-06-26 | Stop reason: SDUPTHER

## 2018-06-19 NOTE — TELEPHONE ENCOUNTER
Last office visit 3/26/18, next appointment 6/26/18. Refill request for Gabapentin 400 mg not listed in medication profile.

## 2018-06-26 ENCOUNTER — OFFICE VISIT (OUTPATIENT)
Dept: NEUROLOGY | Age: 63
End: 2018-06-26

## 2018-06-26 VITALS
TEMPERATURE: 98.4 F | WEIGHT: 171.6 LBS | BODY MASS INDEX: 29.3 KG/M2 | HEIGHT: 64 IN | RESPIRATION RATE: 16 BRPM | DIASTOLIC BLOOD PRESSURE: 82 MMHG | OXYGEN SATURATION: 96 % | SYSTOLIC BLOOD PRESSURE: 135 MMHG | HEART RATE: 76 BPM

## 2018-06-26 DIAGNOSIS — Z79.891 USE OF OPIATES FOR THERAPEUTIC PURPOSES: ICD-10-CM

## 2018-06-26 DIAGNOSIS — R26.9 GAIT DISORDER: Primary | ICD-10-CM

## 2018-06-26 DIAGNOSIS — M54.50 CHRONIC BILATERAL LOW BACK PAIN WITHOUT SCIATICA: ICD-10-CM

## 2018-06-26 DIAGNOSIS — G89.29 CHRONIC BILATERAL LOW BACK PAIN WITHOUT SCIATICA: ICD-10-CM

## 2018-06-26 DIAGNOSIS — Z76.0 MEDICATION REFILL: ICD-10-CM

## 2018-06-26 DIAGNOSIS — M79.18 MYOFASCIAL MUSCLE PAIN: ICD-10-CM

## 2018-06-26 DIAGNOSIS — G43.009 MIGRAINE WITHOUT AURA AND WITHOUT STATUS MIGRAINOSUS, NOT INTRACTABLE: ICD-10-CM

## 2018-06-26 RX ORDER — BACLOFEN 10 MG/1
10 TABLET ORAL 2 TIMES DAILY
Qty: 180 TAB | Refills: 3 | Status: SHIPPED | OUTPATIENT
Start: 2018-06-26

## 2018-06-26 RX ORDER — TRAMADOL HYDROCHLORIDE 50 MG/1
50 TABLET ORAL
Qty: 40 TAB | Refills: 1 | Status: SHIPPED | OUTPATIENT
Start: 2018-06-26

## 2018-06-26 RX ORDER — TRAMADOL HYDROCHLORIDE 50 MG/1
50 TABLET ORAL
Qty: 40 TAB | Refills: 3 | Status: SHIPPED | OUTPATIENT
Start: 2018-06-26 | End: 2018-10-19 | Stop reason: SDUPTHER

## 2018-06-26 RX ORDER — GABAPENTIN 400 MG/1
400 CAPSULE ORAL 3 TIMES DAILY
Qty: 270 CAP | Refills: 0 | Status: SHIPPED | OUTPATIENT
Start: 2018-06-26

## 2018-06-26 NOTE — PROGRESS NOTES
Neurology Progress Note    NAME:  Stephanie Savage   :   1955   MRN:   [id number]     Date/Time:  2018  Subjective:      Stephanie Savage is a 60 y.o. female here today for follow up for headaches pain and off balance. Patient says headache frequency is about 2 times per week, headache is throbbing in nature mostly frontal, sharp pain coming from the back of the head, headache is associated with dizziness, blurry vision, nausea, photophobia, phonophobia. Says headache is aggravated by neck pain, or stress, medication helps if he has a headache on time. Patient says she had been off balance, changing position or getting up, she says she feels dizzy or woozy, but she has not had any fall. She experiences sharp pain in the back, pain is achy, shooting sometimes stabbing pain with burning sensation, his pain goes down the legs causing numbness and tingling sensation in the legs and also weakness of the legs. On a scale of 1-10, patient rates the level of the pain to be between 8 and 9. Pain sometimes wakes patient up at night and increases to the dizziness patient is having.   Review of Systems - General ROS: positive for  - fatigue, malaise, night sweats and sleep disturbance  Psychological ROS: positive for - anxiety, concentration difficulties, depression and sleep disturbances  Ophthalmic ROS: positive for - blurry vision, decreased vision and photophobia  ENT ROS: positive for - headaches, tinnitus, vertigo and visual changes  Allergy and Immunology ROS: negative  Hematological and Lymphatic ROS: negative  Endocrine ROS: negative  Respiratory ROS: no cough, shortness of breath, or wheezing  Cardiovascular ROS: no chest pain or dyspnea on exertion  Gastrointestinal ROS: no abdominal pain, change in bowel habits, or black or bloody stools  Genito-Urinary ROS: no dysuria, trouble voiding, or hematuria  Musculoskeletal ROS: positive for - joint pain, joint stiffness, joint swelling, muscle pain and muscular weakness  Neurological ROS: positive for - dizziness, gait disturbance, headaches, impaired coordination/balance, numbness/tingling, visual changes and weakness  Dermatological ROS: negative      Medications reviewed:  Current Outpatient Prescriptions   Medication Sig Dispense Refill    gabapentin (NEURONTIN) 400 mg capsule Take 1 Cap by mouth three (3) times daily. 270 Cap 0    VITAMIN B-12 500 mcg tablet TK 1 T PO QD  5    DALIRESP tab tablet daily.  traMADol (ULTRAM) 50 mg tablet Take 1 Tab by mouth every six (6) hours as needed for Pain. Max Daily Amount: 200 mg. 40 Tab 3    baclofen (LIORESAL) 10 mg tablet TAKE 1 TABLET BY MOUTH TWICE DAILY 180 Tab 0    ibuprofen (MOTRIN) 800 mg tablet Take  by mouth.  busPIRone (BUSPAR) 5 mg tablet Take  by mouth two (2) times a day.  umeclidinium-vilanterol (ANORO ELLIPTA) 62.5-25 mcg/actuation inhaler Take 1 Puff by inhalation daily.  predniSONE (DELTASONE) 20 mg tablet Take 1 Tab by mouth as needed. 20 Tab 1    buPROPion XL (WELLBUTRIN XL) 300 mg XL tablet TK 1 T PO QD  2    levothyroxine (SYNTHROID) 88 mcg tablet TK 1 T PO QD  2    lisinopril (PRINIVIL, ZESTRIL) 10 mg tablet TK 1 T PO QD  5    QUEtiapine (SEROQUEL) 100 mg tablet Take 100 mg by mouth nightly.  ASPIRIN/SALICYLAMIDE/CAFFEINE (BC HEADACHE POWDER PO) Take  by mouth as needed.  METHYL SALICYLATE/MENTHOL (ICY HOT EX) by Apply Externally route daily.  albuterol (PROVENTIL HFA, VENTOLIN HFA) 90 mcg/Actuation inhaler Take 2-3 Puffs by inhalation every six (6) hours as needed.  ipratropium (ATROVENT HFA) 17 mcg/Actuation inhaler Take 2-3 Puffs by inhalation every six (6) hours as needed.  albuterol-ipratropium (DUO-NEB) 0.5 mg-3 mg(2.5 mg base)/3 mL nebulizer solution 3 mL by Nebulization route every four (4) hours.  simvastatin (ZOCOR) 40 mg tablet Take 40 mg by mouth nightly.  diazepam (VALIUM) 5 mg tablet Take 5 mg by mouth daily as needed.       lamoTRIgine (LAMICTAL) 25 mg tablet TK ONE TABLET PO D FOR 2 WEEKS THEN INCREASE TO ONE TABLET BID  0    lidocaine (LIDODERM) 5 % 1 Patch by TransDERmal route every twenty-four (24) hours. 30 Each 3    codeine-butalbital-acetaminophen-caffeine (FIORICET WITH CODEINE) -36-30 mg per capsule Take 1 Cap by mouth every four (4) hours as needed for Headache. Max Daily Amount: 6 Caps. 40 Cap 1    ergocalciferol (ERGOCALCIFEROL) 50,000 unit capsule TK 1 C PO TWICE A WEEK  AS DIRECTED  3    FLUVIRIN 3022-3147 susp injection ADM 0.5ML IM UTD  0    SUMAtriptan (IMITREX) 100 mg tablet 1 tab at onset of moderate-severe migraine; may repeat 1 tab in 2 hours; Limit: 2 tabs in 24/ hrs, not more than 3 days a week 12 Tab 3    oxyCODONE-acetaminophen (PERCOCET 10)  mg per tablet Take 1 Tab by mouth every four (4) hours as needed for Pain. 60 Tab 0    venlafaxine (EFFEXOR) 75 mg tablet Take 25 mg by mouth two (2) times a day.  tiotropium (SPIRIVA WITH HANDIHALER) 18 mcg inhalation capsule Take 1 Cap by inhalation daily. Indications: CHRONIC OBSTRUCTIVE PULMONARY DISEASE WITH BRONCHOSPASMS      ibandronate (BONIVA) 150 mg tablet Take 150 mg by mouth every thirty (30) days.  OTHER,NON-FORMULARY, Take 80 mg by mouth as needed. \"Asacolec\"   Indications: CONSTIPATION      calcium-cholecalciferol, D3, (CALCIUM WITH VITAMIN D) tablet Take 1 Tab by mouth two (2) times a day.           Objective:   Vitals:  Vitals:    06/26/18 1403   BP: 135/82   Pulse: 76   Resp: 16   Temp: 98.4 °F (36.9 °C)   TempSrc: Temporal   SpO2: 96%   Weight: 171 lb 9.6 oz (77.8 kg)   Height: 5' 4\" (1.626 m)   PainSc:   8   PainLoc: Generalized               Lab Data Reviewed:  Lab Results   Component Value Date/Time    WBC 6.5 04/13/2012 04:50 PM    HCT 42.0 04/13/2012 04:50 PM    HGB 14.5 04/13/2012 04:50 PM    PLATELET 686 07/68/7699 04:50 PM       Lab Results   Component Value Date/Time    Sodium 142 04/13/2012 04:50 PM    Potassium 3.4 (L) 04/13/2012 04:50 PM    Chloride 110 (H) 04/13/2012 04:50 PM    CO2 20 (L) 04/13/2012 04:50 PM    Glucose 171 (H) 04/13/2012 04:50 PM    BUN 10 04/13/2012 04:50 PM    Creatinine 0.6 04/13/2012 04:50 PM    Calcium 8.5 04/13/2012 04:50 PM       No components found for: TROPQUANT    No results found for: MOE      No results found for: HBA1C, HGBE8, WDP0FLHM, [id number]     Lab Results   Component Value Date/Time    Vitamin B12 288 09/25/2017 12:00 PM       No results found for: MOE, ANARX, ANAIGG, XBANA    No results found for: CHOL, CHOLPOCT, CHOLX, CHLST, CHOLV, HDL, HDLPOC, LDL, LDLCPOC, LDLC, DLDLP, VLDLC, VLDL, TGLX, TRIGL, TRIGP, TGLPOCT, CHHD, CHHDX      CT Results (recent): MRI Results (recent):    Results from [place] encounter on 10/04/17   MRI BRAIN W WO CONT   Narrative EXAM:  MRI BRAIN W WO CONT    INDICATION:    dizziness, frequent fall    COMPARISON:  None. CONTRAST: 10 ml ProHance    TECHNIQUE:    MR imaging of the brain was performed with sagittal T1, axial T1, T2, FLAIR,  GRE, DWI/ADC; multiplanar T1 images prior to and following uneventful  intravenous contrast administration. FINDINGS: The ventricular size and configuration are normal. There is mild to  moderate periventricular and scattered subcortical white matter disease. There  is no evidence of mass, hemorrhage, acute infarct or abnormal extra-axial fluid  collection. Normal appearing flow-voids are present in the vertebral, basilar  and carotid artery systems. The craniocervical junction is normal.  The  structures at the cranial base including paranasal sinuses and mastoid air cells  are unremarkable. There is no abnormal parenchymal or meningeal enhancement. Impression IMPRESSION:  Mild to moderate white matter disease likely to chronic small  vessel ischemic disease. No evidence of acute process.                     IR Results (recent):    Results from Hospital Encounter encounter on 10/28/11   IR ARTHROGRAM SI JOINT   Narrative **Final Report**      ICD Codes / Adm. Diagnosis: 719.45  724.2 / PAIN IN JOINT, PELVIC REGION A    LUMBAGO  Examination:  XA ARTHROGRAM SI JOINT  - [id number] - Oct 28 2011 10:28AM  Accession No:  [id number]  Reason:  lumbago      REPORT:  HISTORY: Back pain. PROCEDURE: Informed consent was obtained. Risks of bleeding, infection,   allergy, leg weakness, elevation of blood sugar, steroid flare, low blood   pressure, etc., were explained and understood. MRI was reviewed. Following   sterile prep and local anesthesia, percutaneous placement of a 22-gauge   needle into the collateral sacroiliac joints was performed via posterior   approach under fluoroscopic biplane imaging control and contrast monitoring. 120 mg of Depo-Medrol and 4 mL of Sensorcaine were slowly injected during   needle was removed and dressing placed. No complications are evident. FINDINGS: Needle tip lies within the joint and contrast opacifies the joint   and the adjacent soft tissues. IMPRESSION:    Successful bilateral sacroiliac joint injections. Signing/Reading Doctor: Viola Hutson ([id number])    Approved: Viola Hutson ([id number])  10/28/2011                                      VAS/US Results (recent):  No results found for this or any previous visit. PHYSICAL EXAM:  General:    Alert, cooperative, no distress, appears stated age. Head:   Normocephalic, without obvious abnormality, atraumatic. Eyes:   Conjunctivae/corneas clear. PERRLA  Nose:  Nares normal. No drainage or sinus tenderness. Throat:    Lips, mucosa, and tongue normal.  No Thrush  Neck:  Supple, symmetrical,  no adenopathy, thyroid: non tender    no carotid bruit and no JVD. Paraspinal tenderness  Back:    Symmetric, bilateral tenderness. Lungs:   Clear to auscultation bilaterally. No Wheezing or Rhonchi. No rales. Chest wall:  No tenderness or deformity. No Accessory muscle use.   Heart:   Regular rate and rhythm,  no murmur, rub or gallop. Abdomen:   Soft, non-tender. Not distended. Bowel sounds normal. No masses  Extremities: Extremities normal, atraumatic, No cyanosis. No edema. No clubbing  Skin:     Texture, turgor normal. No rashes or lesions. Not Jaundiced  Lymph nodes: Cervical, supraclavicular normal.  Psych:  Good insight. Not depressed. Not anxious or agitated. NEUROLOGICAL EXAM:  Appearance: The patient is well developed, well nourished, provides a coherent history and is in no acute distress. Mental Status: Oriented to time, place and person. Mood and affect appropriate. Cranial Nerves:   Intact visual fields. Fundi are benign. JOSE, EOM's full, no nystagmus, no ptosis. Facial sensation is normal. Corneal reflexes are intact. Facial movement is symmetric. Hearing is normal bilaterally. Palate is midline with normal sternocleidomastoid and trapezius muscles are normal. Tongue is midline. Motor:  5/5 strength in upper and lower proximal and distal muscles. Normal bulk and tone. No fasciculations. Reflexes:   Deep tendon reflexes 2+/4 and symmetrical.   Sensory:    Dysesthesia to touch, pinprick and vibration. Gait:  Normal gait. Tremor:   No tremor noted. Cerebellar:  No cerebellar signs present. Neurovascular:  Normal heart sounds and regular rhythm, peripheral pulses intact, and no carotid bruits. Assesment  1. Medication refill    - gabapentin (NEURONTIN) 400 mg capsule; Take 1 Cap by mouth three (3) times daily. Dispense: 270 Cap; Refill: 0    2. Migraine without aura and without status migrainosus, not intractable  Continue management    3. Chronic bilateral low back pain without sciatica    - baclofen (LIORESAL) 10 mg tablet; Dispense: 180 Tab; Refill: 0    4. Myofascial muscle pain  Physical therapy  5.  Gait disorder  Physical therapy    ___________________________________________________  PLAN: Medication reviewed with patient      ICD-10-CM ICD-9-CM    1. Gait disorder R26.9 781.2    2. Medication refill Z76.0 V68.1 gabapentin (NEURONTIN) 400 mg capsule   3. Migraine without aura and without status migrainosus, not intractable G43.009 346.10    4. Chronic bilateral low back pain without sciatica M54.5 724.2 baclofen (LIORESAL) 10 mg tablet    G89.29 338.29    5. Myofascial muscle pain M79.1 729.1      Follow-up Disposition:  Return in about 3 months (around 9/26/2018).            ___________________________________________________    Total time spent with patient:  []15   []25   []35   [] __ minutes    Care Plan discussed with:    [x]Patient   []Family    []Care Manager   []Consultant/Specialist :    ___________________________________________________    Attending Physician: He Felder MD

## 2018-06-26 NOTE — MR AVS SNAPSHOT
Tequila Layton [address] [facility] 13 
[phone] Patient: Matt Chang MRN: [id number] :1955 Visit Information Date & Time Provider Department Dept. Phone Encounter #  
 2018  1:40 PM Louis Garcia MD [facility] at [facility] [phone] [id number] Follow-up Instructions Return in about 3 months (around 2018). Upcoming Health Maintenance Date Due Hepatitis C Screening 1955 Pneumococcal 19-64 Medium Risk (1 of 1 - PPSV23) 10/28/1973 DTaP/Tdap/Td series (1 - Tdap) 10/28/1975 BREAST CANCER SCRN MAMMOGRAM 10/28/2004 FOBT Q 1 YEAR AGE 50-75 10/28/2004 ZOSTER VACCINE AGE 60> 2014 MEDICARE YEARLY EXAM 3/14/2018 Influenza Age 5 to Adult 2018 PAP AKA CERVICAL CYTOLOGY 2021 Allergies as of 2018  Review Complete On: 2018 By: Garfield Kern MD  
 No Known Allergies Current Immunizations  Never Reviewed No immunizations on file. Not reviewed this visit You Were Diagnosed With   
  
 Codes Comments Gait disorder    -  Primary ICD-10-CM: R26.9 ICD-9-CM: 726. 2 Medication refill     ICD-10-CM: Z76.0 ICD-9-CM: V68.1 Migraine without aura and without status migrainosus, not intractable     ICD-10-CM: V90.141 ICD-9-CM: 346.10 Chronic bilateral low back pain without sciatica     ICD-10-CM: M54.5, G89.29 ICD-9-CM: 724.2, 338.29 Myofascial muscle pain     ICD-10-CM: M79.1 ICD-9-CM: 729.1 Vitals BP Pulse Temp Resp Height(growth percentile) 135/82 (BP 1 Location: Left arm, BP Patient Position: Sitting) 76 98.4 °F (36.9 °C) (Temporal) 16 5' 4\" (1.626 m) Weight(growth percentile) SpO2 BMI OB Status Smoking Status 171 lb 9.6 oz (77.8 kg) 96% 29.46 kg/m2 Postmenopausal Current Every Day Smoker BMI and BSA Data  Body Mass Index Body Surface Area  
 29.46 kg/m 2 1.87 m 2  
  
 Preferred Pharmacy Pharmacy Name Phone [address], [address]  [phone] Your Updated Medication List  
  
   
This list is accurate as of 6/26/18  2:22 PM.  Always use your most recent med list.  
  
  
  
  
 albuterol 90 mcg/actuation inhaler Commonly known as:  PROVENTIL HFA, VENTOLIN HFA, PROAIR HFA Take 2-3 Puffs by inhalation every six (6) hours as needed. albuterol-ipratropium 2.5 mg-0.5 mg/3 ml Nebu Commonly known as:  DUO-NEB  
3 mL by Nebulization route every four (4) hours. ANORO ELLIPTA 62.5-25 mcg/actuation inhaler Generic drug:  umeclidinium-vilanterol Take 1 Puff by inhalation daily. ATROVENT HFA 17 mcg/actuation inhaler Generic drug:  ipratropium Take 2-3 Puffs by inhalation every six (6) hours as needed. baclofen 10 mg tablet Commonly known as:  LIORESAL Take 1 Tab by mouth two (2) times a day. BC HEADACHE POWDER PO Take  by mouth as needed. BONIVA 150 mg tablet Generic drug:  ibandronate Take 150 mg by mouth every thirty (30) days. buPROPion  mg XL tablet Commonly known as:  WELLBUTRIN XL  
TK 1 T PO QD  
  
 busPIRone 5 mg tablet Commonly known as:  BUSPAR Take  by mouth two (2) times a day. CALCIUM WITH VITAMIN D tablet Generic drug:  calcium-cholecalciferol (D3) Take 1 Tab by mouth two (2) times a day. codeine-butalbital-acetaminophen-caffeine -91-30 mg capsule Commonly known as:  FIORICET WITH CODEINE Take 1 Cap by mouth every four (4) hours as needed for Headache. Max Daily Amount: 6 Caps. DALIRESP Tab tablet Generic drug:  roflumilast  
daily. diazePAM 5 mg tablet Commonly known as:  VALIUM Take 5 mg by mouth daily as needed. ergocalciferol 50,000 unit capsule Commonly known as:  ERGOCALCIFEROL TK 1 C PO TWICE A WEEK  AS DIRECTED  
  
 FLUVIRIN [id number] Susp injection Generic drug:  influenza vaccine 2017-18 (4 yrs+) ADM 0.5ML IM UTD  
  
 gabapentin 400 mg capsule Commonly known as:  NEURONTIN Take 1 Cap by mouth three (3) times daily. ibuprofen 800 mg tablet Commonly known as:  MOTRIN Take  by mouth. ICY HOT EX  
by Apply Externally route daily. lamoTRIgine 25 mg tablet Commonly known as: LaMICtal  
TK ONE TABLET PO D FOR 2 WEEKS THEN INCREASE TO ONE TABLET BID  
  
 levothyroxine 88 mcg tablet Commonly known as:  SYNTHROID TK 1 T PO QD  
  
 lidocaine 5 % Commonly known as:  LIDODERM  
1 Patch by TransDERmal route every twenty-four (24) hours. lisinopril 10 mg tablet Commonly known as:  Emelyn Primmer TK 1 T PO QD  
  
 OTHER(NON-FORMULARY) Take 80 mg by mouth as needed. \"Asacolec\"   Indications: CONSTIPATION  
  
 oxyCODONE-acetaminophen  mg per tablet Commonly known as:  PERCOCET 10 Take 1 Tab by mouth every four (4) hours as needed for Pain. predniSONE 20 mg tablet Commonly known as:  Susen Arbour Take 1 Tab by mouth as needed. QUEtiapine 100 mg tablet Commonly known as:  SEROquel Take 100 mg by mouth nightly. simvastatin 40 mg tablet Commonly known as:  ZOCOR Take 40 mg by mouth nightly. SPIRIVA WITH HANDIHALER 18 mcg inhalation capsule Generic drug:  tiotropium Take 1 Cap by inhalation daily. Indications: CHRONIC OBSTRUCTIVE PULMONARY DISEASE WITH BRONCHOSPASMS  
  
 SUMAtriptan 100 mg tablet Commonly known as:  IMITREX  
1 tab at onset of moderate-severe migraine; may repeat 1 tab in 2 hours; Limit: 2 tabs in 24/ hrs, not more than 3 days a week * traMADol 50 mg tablet Commonly known as:  ULTRAM  
Take 1 Tab by mouth every six (6) hours as needed for Pain. Max Daily Amount: 200 mg.  
  
 * traMADol 50 mg tablet Commonly known as:  ULTRAM  
Take 1 Tab by mouth every six (6) hours as needed for Pain. Max Daily Amount: 200 mg. venlafaxine 75 mg tablet Commonly known as:  [facility] Take 25 mg by mouth two (2) times a day. VITAMIN B-12 500 mcg tablet Generic drug:  cyanocobalamin TK 1 T PO QD  
  
 * Notice: This list has 2 medication(s) that are the same as other medications prescribed for you. Read the directions carefully, and ask your doctor or other care provider to review them with you. Prescriptions Printed Refills  
 traMADol (ULTRAM) 50 mg tablet 1 Sig: Take 1 Tab by mouth every six (6) hours as needed for Pain. Max Daily Amount: 200 mg. Class: Print Route: Oral  
 traMADol (ULTRAM) 50 mg tablet 3 Sig: Take 1 Tab by mouth every six (6) hours as needed for Pain. Max Daily Amount: 200 mg. Class: Print Route: Oral  
  
Prescriptions Sent to Pharmacy Refills  
 baclofen (LIORESAL) 10 mg tablet 3 Sig: Take 1 Tab by mouth two (2) times a day. Class: Normal  
 Pharmacy: [facility] [address] Ph #: [phone] Route: Oral  
 gabapentin (NEURONTIN) 400 mg capsule 0 Sig: Take 1 Cap by mouth three (3) times daily. Class: Normal  
 Pharmacy: [facility] [address] Ph #: [phone] Route: Oral  
  
Follow-up Instructions Return in about 3 months (around 9/26/2018). Introducing [facility]! Veronica Scales introduces [employer] patient portal. Now you can access parts of your medical record, email your doctor's office, and request medication refills online. 1. In your internet browser, go to [email or web address] 2. Click on the First Time User? Click Here link in the Sign In box. You will see the New Member Sign Up page. 3. Enter your [employer] Access Code exactly as it appears below. You will not need to use this code after youve completed the sign-up process.  If you do not sign up before the expiration date, you must request a new code. · [employer] Access Code: [email or web address] Expires: 9/24/2018  2:02 PM 
 
4. Enter the last four digits of your Social Security Number (xxxx) and Date of Birth (mm/dd/yyyy) as indicated and click Submit. You will be taken to the next sign-up page. 5. Create a [employer] ID. This will be your [employer] login ID and cannot be changed, so think of one that is secure and easy to remember. 6. Create a [employer] password. You can change your password at any time. 7. Enter your Password Reset Question and Answer. This can be used at a later time if you forget your password. 8. Enter your e-mail address. You will receive e-mail notification when new information is available in [address] 9. Click Sign Up. You can now view and download portions of your medical record. 10. Click the Download Summary menu link to download a portable copy of your medical information. If you have questions, please visit the Frequently Asked Questions section of the [employer] website. Remember, [employer] is NOT to be used for urgent needs. For medical emergencies, dial 911. Now available from your iPhone and Android! Please provide this summary of care documentation to your next provider. Your primary care clinician is listed as Rashid Jones. If you have any questions after today's visit, please call [phone].

## 2018-06-26 NOTE — PROGRESS NOTES
Chief Complaint   Patient presents with    Migraine    Medication Refill     1. Have you been to the ER, urgent care clinic since your last visit? Hospitalized since your last visit? no    2. Have you seen or consulted any other health care providers outside of the [address] [place] [address] since your last visit? Include any pap smears or colon screening.  Dr. Latisha Agustin count not performed patient did not bring medications        Pill count not verified with patient  Patient reports taking medication    UDS obtained and sent   Pain contract will be signed next office visit   not available   Script given for Ultram ( June and July)

## 2018-06-30 LAB — DRUGS UR: NORMAL

## 2018-07-09 DIAGNOSIS — Z76.0 MEDICATION REFILL: ICD-10-CM

## 2018-07-09 RX ORDER — BACLOFEN 10 MG/1
TABLET ORAL
Qty: 180 TAB | Refills: 0 | Status: SHIPPED | OUTPATIENT
Start: 2018-07-09 | End: 2020-06-13

## 2018-10-19 DIAGNOSIS — M79.18 MYOFASCIAL MUSCLE PAIN: ICD-10-CM

## 2018-10-19 DIAGNOSIS — M54.50 CHRONIC BILATERAL LOW BACK PAIN WITHOUT SCIATICA: ICD-10-CM

## 2018-10-19 DIAGNOSIS — G43.009 MIGRAINE WITHOUT AURA AND WITHOUT STATUS MIGRAINOSUS, NOT INTRACTABLE: ICD-10-CM

## 2018-10-19 DIAGNOSIS — G89.29 CHRONIC BILATERAL LOW BACK PAIN WITHOUT SCIATICA: ICD-10-CM

## 2018-10-20 RX ORDER — TRAMADOL HYDROCHLORIDE 50 MG/1
50 TABLET ORAL
Qty: 40 TAB | Refills: 3 | Status: SHIPPED | OUTPATIENT
Start: 2018-10-20 | End: 2021-10-19 | Stop reason: SDUPTHER

## 2018-10-25 RX ORDER — PREDNISONE 20 MG/1
TABLET ORAL
Qty: 20 TAB | Refills: 0 | Status: SHIPPED | OUTPATIENT
Start: 2018-10-25 | End: 2019-01-28 | Stop reason: SDUPTHER

## 2019-01-28 RX ORDER — PREDNISONE 20 MG/1
TABLET ORAL
Qty: 20 TAB | Refills: 0 | Status: SHIPPED | OUTPATIENT
Start: 2019-01-28 | End: 2020-10-26 | Stop reason: CLARIF

## 2019-12-18 ENCOUNTER — OFFICE VISIT (OUTPATIENT)
Dept: INTERNAL MEDICINE CLINIC | Age: 64
End: 2019-12-18

## 2019-12-18 VITALS
OXYGEN SATURATION: 92 % | TEMPERATURE: 97.7 F | HEIGHT: 64 IN | HEART RATE: 74 BPM | DIASTOLIC BLOOD PRESSURE: 67 MMHG | SYSTOLIC BLOOD PRESSURE: 122 MMHG | WEIGHT: 161 LBS | RESPIRATION RATE: 16 BRPM | BODY MASS INDEX: 27.49 KG/M2

## 2019-12-18 DIAGNOSIS — G89.29 CHRONIC MIDLINE LOW BACK PAIN WITHOUT SCIATICA: Primary | ICD-10-CM

## 2019-12-18 DIAGNOSIS — Z13.31 SCREENING FOR DEPRESSION: ICD-10-CM

## 2019-12-18 DIAGNOSIS — J45.20 MILD INTERMITTENT REACTIVE AIRWAY DISEASE WITHOUT COMPLICATION: ICD-10-CM

## 2019-12-18 DIAGNOSIS — E03.9 ACQUIRED HYPOTHYROIDISM: ICD-10-CM

## 2019-12-18 DIAGNOSIS — M54.50 CHRONIC MIDLINE LOW BACK PAIN WITHOUT SCIATICA: Primary | ICD-10-CM

## 2019-12-18 DIAGNOSIS — E78.5 DYSLIPIDEMIA: ICD-10-CM

## 2019-12-18 DIAGNOSIS — Z00.00 WELLNESS EXAMINATION: ICD-10-CM

## 2019-12-18 DIAGNOSIS — Z13.39 SCREENING FOR ALCOHOLISM: ICD-10-CM

## 2019-12-18 DIAGNOSIS — J43.1 PANLOBULAR EMPHYSEMA (HCC): ICD-10-CM

## 2019-12-18 DIAGNOSIS — I10 ESSENTIAL HYPERTENSION: ICD-10-CM

## 2019-12-18 RX ORDER — PROMETHAZINE HYDROCHLORIDE 25 MG/1
25 TABLET ORAL
COMMUNITY

## 2019-12-18 RX ORDER — ATORVASTATIN CALCIUM 20 MG/1
20 TABLET, FILM COATED ORAL DAILY
COMMUNITY
Start: 2019-11-27 | End: 2020-03-02

## 2019-12-18 NOTE — PROGRESS NOTES
Chief Complaint   Patient presents with    New Patient     Patient in office to establish care and she is concerned about recent falls. She states that she cannot stand for long periods of time; she states, she has back pain and her \"knees go out\". 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 Patient also has a history of hypertension, hypothyroidism, and COPD.  Annual Wellness Visit     1. Have you been to the ER, urgent care clinic since your last visit? Hospitalized since your last visit? No    2. Have you seen or consulted any other health care providers outside of the [address] [place] [address] since your last visit? Include any pap smears or colon screening.  No

## 2019-12-18 NOTE — PROGRESS NOTES
[address] [facility]  [facility]  [address] [facility] [address]  Phone:  [phone]  Fax: [phone]       Chief Complaint   Patient presents with   [facility] Patient     Patient in office to establish care and she is concerned about recent falls. She states that she cannot stand for long periods of time; she states, she has back pain and her \"knees go out\". 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 Patient also has a history of hypertension, hypothyroidism, and COPD.  Annual Wellness Visit   . SUBJECTIVE:    Huseyin Mary is a 71 y.o. female Comes in as a new patient. She has a history of fibromyalgia and apparently was disabled from this in 2006. She has had generalized pain as a direct result. She was formerly seeing a rheumatologist, but has not seen one now and is really not taking any analgesics at this point. She has a gait disturbance related to chronic low back pain. She is status post lumbar laminectomy approximately three to four years ago. She has a long history of cigarette smoking and does have existing COPD. She continues to smoke, which is complicated oftentimes by reactive airway disease. She has a past history of primary hypertension and dyslipidemia. Current Outpatient Medications   Medication Sig Dispense Refill    atorvastatin (LIPITOR) 20 mg tablet Take 20 mg by mouth daily.  promethazine (PHENERGAN) 25 mg tablet Take 25 mg by mouth every six (6) hours as needed for Nausea.  predniSONE (DELTASONE) 20 mg tablet TAKE 1 TABLET BY MOUTH AS NEEDED 20 Tab 0    DALIRESP tab tablet daily.  umeclidinium-vilanterol (ANORO ELLIPTA) 62.5-25 mcg/actuation inhaler Take 1 Puff by inhalation daily.  buPROPion XL (WELLBUTRIN XL) 300 mg XL tablet TK 1 T PO QD  2    levothyroxine (SYNTHROID) 88 mcg tablet Take 88 mcg by mouth Daily (before breakfast).   2    lisinopril (PRINIVIL, ZESTRIL) 10 mg tablet Take 10 mg by mouth daily. 5    QUEtiapine (SEROQUEL) 100 mg tablet Take 100 mg by mouth nightly.  ASPIRIN/SALICYLAMIDE/CAFFEINE (BC HEADACHE POWDER PO) Take  by mouth as needed.  METHYL SALICYLATE/MENTHOL (ICY HOT EX) by Apply Externally route daily.  albuterol (PROVENTIL HFA, VENTOLIN HFA) 90 mcg/Actuation inhaler Take 2-3 Puffs by inhalation every six (6) hours as needed.  ipratropium (ATROVENT HFA) 17 mcg/Actuation inhaler Take 2-3 Puffs by inhalation every six (6) hours as needed.  albuterol-ipratropium (DUO-NEB) 0.5 mg-3 mg(2.5 mg base)/3 mL nebulizer solution 3 mL by Nebulization route every four (4) hours.  diazepam (VALIUM) 5 mg tablet Take 5 mg by mouth daily as needed.  traMADol (ULTRAM) 50 mg tablet Take 1 Tab by mouth every six (6) hours as needed for Pain. Max Daily Amount: 200 mg. 40 Tab 3    baclofen (LIORESAL) 10 mg tablet TAKE 1 TABLET BY MOUTH TWICE DAILY 180 Tab 0    baclofen (LIORESAL) 10 mg tablet Take 1 Tab by mouth two (2) times a day. 180 Tab 3    gabapentin (NEURONTIN) 400 mg capsule Take 1 Cap by mouth three (3) times daily. 270 Cap 0    traMADol (ULTRAM) 50 mg tablet Take 1 Tab by mouth every six (6) hours as needed for Pain. Max Daily Amount: 200 mg. 40 Tab 1    VITAMIN B-12 500 mcg tablet TK 1 T PO QD  5    lamoTRIgine (LAMICTAL) 25 mg tablet TK ONE TABLET PO D FOR 2 WEEKS THEN INCREASE TO ONE TABLET BID  0    ibuprofen (MOTRIN) 800 mg tablet Take  by mouth.  busPIRone (BUSPAR) 5 mg tablet Take  by mouth two (2) times a day.  lidocaine (LIDODERM) 5 % 1 Patch by TransDERmal route every twenty-four (24) hours. 30 Each 3    codeine-butalbital-acetaminophen-caffeine (FIORICET WITH CODEINE) -20-30 mg per capsule Take 1 Cap by mouth every four (4) hours as needed for Headache. Max Daily Amount: 6 Caps.  40 Cap 1    ergocalciferol (ERGOCALCIFEROL) 50,000 unit capsule TK 1 C PO TWICE A WEEK  AS DIRECTED  3    FLUVIRIN 1205-0156 susp injection ADM 0.5ML IM UTD  0    SUMAtriptan (IMITREX) 100 mg tablet 1 tab at onset of moderate-severe migraine; may repeat 1 tab in 2 hours; Limit: 2 tabs in 24/ hrs, not more than 3 days a week 12 Tab 3    oxyCODONE-acetaminophen (PERCOCET 10)  mg per tablet Take 1 Tab by mouth every four (4) hours as needed for Pain. 60 Tab 0    venlafaxine (EFFEXOR) 75 mg tablet Take 25 mg by mouth two (2) times a day.  tiotropium (SPIRIVA WITH HANDIHALER) 18 mcg inhalation capsule Take 1 Cap by inhalation daily. Indications: CHRONIC OBSTRUCTIVE PULMONARY DISEASE WITH BRONCHOSPASMS      ibandronate (BONIVA) 150 mg tablet Take 150 mg by mouth every thirty (30) days.  OTHER,NON-FORMULARY, Take 80 mg by mouth as needed. \"Asacolec\"   Indications: CONSTIPATION      simvastatin (ZOCOR) 40 mg tablet Take 40 mg by mouth nightly.  calcium-cholecalciferol, D3, (CALCIUM WITH VITAMIN D) tablet Take 1 Tab by mouth two (2) times a day.        Past Medical History:   Diagnosis Date    Anxiety disorder     Arthritis     Bulging disc     Constipation     COPD     Cough     Depression     Diarrhea     Falls     Fatigue     Fibromyalgia     Frequent headaches     GERD (gastroesophageal reflux disease)     Hearing loss     Hypercholesteremia     Hypothyroidism     Incontinence     Joint pain     Leg swelling     Memory disorder     Muscle pain     Muscle weakness     Nausea and vomiting     Neuropathy     Osteopenia     Other ill-defined conditions(842.30)     fibromyalgia  osteopenia    Psychiatric disorder     Snoring     SOB (shortness of breath)     Stomach pain     Swallowing difficulty     Thyroid disease     hypothyroidism    Unspecified sleep apnea     Visual disturbance      Past Surgical History:   Procedure Laterality Date    HX GYN       No Known Allergies      REVIEW OF SYSTEMS:  General: negative for - chills or fever  ENT: negative for - headaches, nasal congestion or tinnitus  Respiratory: negative for - cough, hemoptysis, shortness of breath or wheezing  Cardiovascular : negative for - chest pain, edema, palpitations or shortness of breath  Gastrointestinal: negative for - abdominal pain, blood in stools, heartburn or nausea/vomiting  Genito-Urinary: no dysuria, trouble voiding, or hematuria  Musculoskeletal: negative for - gait disturbance, joint pain, joint stiffness or joint swelling  Neurological: no TIA or stroke symptoms  Hematologic: no bruises, no bleeding, no swollen glands  Integument: no lumps, mole changes, nail changes or rash  Endocrine: no malaise/lethargy or unexpected weight changes      Social History     Socioeconomic History    Marital status:      Spouse name: Not on file    Number of children: Not on file    Years of education: Not on file    Highest education level: Not on file   Tobacco Use    Smoking status: Current Every Day Smoker     Packs/day: 1.50     Years: 42.00     Pack years: 63.00    Smokeless tobacco: Never Used   Substance and Sexual Activity    Alcohol use: No     Family History   Problem Relation Age of Onset    Cancer Paternal Grandmother         breast    Emphysema Paternal Grandmother        OBJECTIVE:    Visit Vitals  /67   Pulse 74   Temp 97.7 °F (36.5 °C) (Oral)   Resp 16   Ht 5' 4\" (1.626 m)   Wt 161 lb (73 kg)   SpO2 92%   BMI 27.64 kg/m²     CONSTITUTIONAL: well , well nourished, appears age appropriate  EYES: perrla, eom intact  ENMT:moist mucous membranes, pharynx clear  NECK: supple. Thyroid normal  RESPIRATORY: Chest: clear to ascultation and percussion   CARDIOVASCULAR: Heart: regular rate and rhythm  GASTROINTESTINAL: Abdomen: soft, bowel sounds active  HEMATOLOGIC: no pathological lymph nodes palpated  MUSCULOSKELETAL: Extremities: no edema, pulse 1+   INTEGUMENT: No unusual rashes or suspicious skin lesions noted.  Nails appear normal.  NEUROLOGIC: non-focal exam MENTAL STATUS: alert and oriented, appropriate affect      ASSESSMENT:  1. Chronic midline low back pain without sciatica    2. Panlobular emphysema ([address]    3. Mild intermittent reactive airway disease without complication    4. Dyslipidemia    5. Essential hypertension    6. Acquired hypothyroidism    7. Screening for alcoholism    8. Screening for depression    9. Wellness examination        PLAN:    1. She has a history of chronic low back pain without sciatica. It is unclear to me as to the tracy etiology other than probable lumbar spondylosis. A more thorough workup needs to occur for better definition. She probably needs to be referred to orthopedics. 2. She continues to smoke cigarettes in spite of the fact that she has existing emphysema complicated by reactive airway disease. 3. Obviously this needs to stop. 4. She will continue her bronchodilators as prescribed. 5. She has a significant increased cardiovascular risk and is taking a statin. Efficacy and compliance will be assessed. 6. Her BP is excellent today, she will continue her antihypertensive medication as prescribed. .  Orders Placed This Encounter    Depression Screen Annual    APOLIPOPROTEIN B    CBC WITH AUTOMATED DIFF    LIPID PANEL    METABOLIC PANEL, COMPREHENSIVE    TSH 3RD GENERATION    URINALYSIS W/ RFLX MICROSCOPIC    MICROSCOPIC EXAMINATION    AMB POC EKG ROUTINE W/ 12 LEADS, INTER & REP    atorvastatin (LIPITOR) 20 mg tablet    promethazine (PHENERGAN) 25 mg tablet         Follow-up and Dispositions    · Return in about 4 weeks (around 1/15/2020). Shelton Lai MD    This is a \"Welcome to [employer]"  Initial Preventive Physical Examination (IPPE) providing Personalized Prevention Plan Services (Performed in the first 12 months of enrollment)    I have reviewed the patient's medical history in detail and updated the computerized patient record.      History     Past Medical History:   Diagnosis Date  Anxiety disorder     Arthritis     Bulging disc     Constipation     COPD     Cough     Depression     Diarrhea     Falls     Fatigue     Fibromyalgia     Frequent headaches     GERD (gastroesophageal reflux disease)     Hearing loss     Hypercholesteremia     Hypothyroidism     Incontinence     Joint pain     Leg swelling     Memory disorder     Muscle pain     Muscle weakness     Nausea and vomiting     Neuropathy     Osteopenia     Other ill-defined conditions(979.91)     fibromyalgia  osteopenia    Psychiatric disorder     Snoring     SOB (shortness of breath)     Stomach pain     Swallowing difficulty     Thyroid disease     hypothyroidism    Unspecified sleep apnea     Visual disturbance       Past Surgical History:   Procedure Laterality Date    HX GYN       Current Outpatient Medications   Medication Sig Dispense Refill    atorvastatin (LIPITOR) 20 mg tablet Take 20 mg by mouth daily.  promethazine (PHENERGAN) 25 mg tablet Take 25 mg by mouth every six (6) hours as needed for Nausea.  predniSONE (DELTASONE) 20 mg tablet TAKE 1 TABLET BY MOUTH AS NEEDED 20 Tab 0    DALIRESP tab tablet daily.  umeclidinium-vilanterol (ANORO ELLIPTA) 62.5-25 mcg/actuation inhaler Take 1 Puff by inhalation daily.  buPROPion XL (WELLBUTRIN XL) 300 mg XL tablet TK 1 T PO QD  2    levothyroxine (SYNTHROID) 88 mcg tablet Take 88 mcg by mouth Daily (before breakfast). 2    lisinopril (PRINIVIL, ZESTRIL) 10 mg tablet Take 10 mg by mouth daily. 5    QUEtiapine (SEROQUEL) 100 mg tablet Take 100 mg by mouth nightly.  ASPIRIN/SALICYLAMIDE/CAFFEINE (BC HEADACHE POWDER PO) Take  by mouth as needed.  METHYL SALICYLATE/MENTHOL (ICY HOT EX) by Apply Externally route daily.  albuterol (PROVENTIL HFA, VENTOLIN HFA) 90 mcg/Actuation inhaler Take 2-3 Puffs by inhalation every six (6) hours as needed.       ipratropium (ATROVENT HFA) 17 mcg/Actuation inhaler Take 2-3 Puffs by inhalation every six (6) hours as needed.  albuterol-ipratropium (DUO-NEB) 0.5 mg-3 mg(2.5 mg base)/3 mL nebulizer solution 3 mL by Nebulization route every four (4) hours.  diazepam (VALIUM) 5 mg tablet Take 5 mg by mouth daily as needed.  traMADol (ULTRAM) 50 mg tablet Take 1 Tab by mouth every six (6) hours as needed for Pain. Max Daily Amount: 200 mg. 40 Tab 3    baclofen (LIORESAL) 10 mg tablet TAKE 1 TABLET BY MOUTH TWICE DAILY 180 Tab 0    baclofen (LIORESAL) 10 mg tablet Take 1 Tab by mouth two (2) times a day. 180 Tab 3    gabapentin (NEURONTIN) 400 mg capsule Take 1 Cap by mouth three (3) times daily. 270 Cap 0    traMADol (ULTRAM) 50 mg tablet Take 1 Tab by mouth every six (6) hours as needed for Pain. Max Daily Amount: 200 mg. 40 Tab 1    VITAMIN B-12 500 mcg tablet TK 1 T PO QD  5    lamoTRIgine (LAMICTAL) 25 mg tablet TK ONE TABLET PO D FOR 2 WEEKS THEN INCREASE TO ONE TABLET BID  0    ibuprofen (MOTRIN) 800 mg tablet Take  by mouth.  busPIRone (BUSPAR) 5 mg tablet Take  by mouth two (2) times a day.  lidocaine (LIDODERM) 5 % 1 Patch by TransDERmal route every twenty-four (24) hours. 30 Each 3    codeine-butalbital-acetaminophen-caffeine (FIORICET WITH CODEINE) -49-30 mg per capsule Take 1 Cap by mouth every four (4) hours as needed for Headache. Max Daily Amount: 6 Caps. 40 Cap 1    ergocalciferol (ERGOCALCIFEROL) 50,000 unit capsule TK 1 C PO TWICE A WEEK  AS DIRECTED  3    FLUVIRIN [id number] susp injection ADM 0.5ML IM UTD  0    SUMAtriptan (IMITREX) 100 mg tablet 1 tab at onset of moderate-severe migraine; may repeat 1 tab in 2 hours; Limit: 2 tabs in 24/ hrs, not more than 3 days a week 12 Tab 3    oxyCODONE-acetaminophen (PERCOCET 10)  mg per tablet Take 1 Tab by mouth every four (4) hours as needed for Pain. 60 Tab 0    venlafaxine (EFFEXOR) 75 mg tablet Take 25 mg by mouth two (2) times a day.         tiotropium (SPIRIVA WITH HANDIHALER) 18 mcg inhalation capsule Take 1 Cap by inhalation daily. Indications: CHRONIC OBSTRUCTIVE PULMONARY DISEASE WITH BRONCHOSPASMS      ibandronate (BONIVA) 150 mg tablet Take 150 mg by mouth every thirty (30) days.  OTHER,NON-FORMULARY, Take 80 mg by mouth as needed. \"Asacolec\"   Indications: CONSTIPATION      simvastatin (ZOCOR) 40 mg tablet Take 40 mg by mouth nightly.  calcium-cholecalciferol, D3, (CALCIUM WITH VITAMIN D) tablet Take 1 Tab by mouth two (2) times a day. No Known Allergies    Family History   Problem Relation Age of Onset    Cancer Paternal Grandmother         breast    Emphysema Paternal Grandmother      Social History     Tobacco Use    Smoking status: Current Every Day Smoker     Packs/day: 1.50     Years: 42.00     Pack years: 63.00    Smokeless tobacco: Never Used   Substance Use Topics    Alcohol use: No       Depression Risk Screen     3 most recent PHQ Screens 10/23/2017   Little interest or pleasure in doing things Not at all   Feeling down, depressed, irritable, or hopeless Several days   Total Score PHQ 2 1       Alcohol Risk Factor Screening:   Do you average 1 drink per night or more than 7 drinks a week:  No    On any one occasion in the past three months have you have had more than 3 drinks containing alcohol:  No      Functional Ability and Level of Safety   Diet: No special diet    Hearing: Hearing is good. Vision Screening:  Vision is good. No exam data present      Activities of Daily Living: The home contains: no safety equipment. Patient does total self care    Ambulation: with no difficulty    Exercise level: moderately active    Fall Risk Screen:  Fall Risk Assessment, last 12 mths 12/18/2019   Able to walk? Yes   Fall in past 12 months? Yes   Fall with injury?  Yes   Number of falls in past 12 months 4   Fall Risk Score 5       Abuse Screen:  Patient is not abused    Screening EKG   EKG order placed: Yes    Patient Care Team   Patient Care Team:  Emilie Oliveira MD as PCP - General (Internal Medicine)  [facility]il[facility]     End of Life Planning   Advanced care planning directives were not discussed with the patient and/or family/caregiver. Assessment/Plan   Education and counseling provided:  Are appropriate based on today's review and evaluation    Diagnoses and all orders for this visit:    1. Chronic midline low back pain without sciatica    2. Panlobular emphysema ([address]    3. Mild intermittent reactive airway disease without complication    4. Dyslipidemia  -     APOLIPOPROTEIN B  -     LIPID PANEL  -     AMB POC EKG ROUTINE W/ 12 LEADS, INTER & REP    5. Essential hypertension  -     CBC WITH AUTOMATED DIFF  -     COLLECTION VENOUS BLOOD,VENIPUNCTURE  -     METABOLIC PANEL, COMPREHENSIVE  -     URINALYSIS W/ RFLX MICROSCOPIC  -     AMB POC EKG ROUTINE W/ 12 LEADS, INTER & REP    6. Acquired hypothyroidism  -     TSH 3RD GENERATION    7. Screening for alcoholism  -     [place] ANNUAL ALCOHOL SCREEN 15 MIN    8. Screening for depression  -     DEPRESSION SCREEN ANNUAL    9.  Wellness examination    Other orders  -     MICROSCOPIC EXAMINATION        Health Maintenance Due   Topic Date Due    Hepatitis C Screening  1955    BREAST CANCER SCRN MAMMOGRAM  10/28/2004    FOBT Q 1 YEAR AGE 50-75  10/28/2004    GLAUCOMA SCREENING Q2Y  10/28/2019    Shingrix Vaccine Age 50> (2 of 2) 12/06/2019

## 2019-12-19 LAB
ALBUMIN SERPL-MCNC: 4.5 G/DL (ref 3.6–4.8)
ALBUMIN/GLOB SERPL: 2 {RATIO} (ref 1.2–2.2)
ALP SERPL-CCNC: 79 IU/L (ref 39–117)
ALT SERPL-CCNC: 16 IU/L (ref 0–32)
APO B SERPL-MCNC: 79 MG/DL
APPEARANCE UR: CLEAR
AST SERPL-CCNC: 15 IU/L (ref 0–40)
BACTERIA #/AREA URNS HPF: ABNORMAL /[HPF]
BASOPHILS # BLD AUTO: 0.1 X10E3/UL (ref 0–0.2)
BASOPHILS NFR BLD AUTO: 1 %
BILIRUB SERPL-MCNC: <0.2 MG/DL (ref 0–1.2)
BILIRUB UR QL STRIP: NEGATIVE
BUN SERPL-MCNC: 11 MG/DL (ref 8–27)
BUN/CREAT SERPL: 13 (ref 12–28)
CALCIUM SERPL-MCNC: 9.6 MG/DL (ref 8.7–10.3)
CASTS URNS QL MICRO: ABNORMAL /LPF
CHLORIDE SERPL-SCNC: 105 MMOL/L (ref 96–106)
CHOLEST SERPL-MCNC: 158 MG/DL (ref 100–199)
CO2 SERPL-SCNC: 22 MMOL/L (ref 20–29)
COLOR UR: YELLOW
CREAT SERPL-MCNC: 0.83 MG/DL (ref 0.57–1)
EOSINOPHIL # BLD AUTO: 0.3 X10E3/UL (ref 0–0.4)
EOSINOPHIL NFR BLD AUTO: 3 %
EPI CELLS #/AREA URNS HPF: ABNORMAL /HPF (ref 0–10)
ERYTHROCYTE [DISTWIDTH] IN BLOOD BY AUTOMATED COUNT: 12.5 % (ref 12.3–15.4)
GLOBULIN SER CALC-MCNC: 2.2 G/DL (ref 1.5–4.5)
GLUCOSE SERPL-MCNC: 88 MG/DL (ref 65–99)
GLUCOSE UR QL: NEGATIVE
HCT VFR BLD AUTO: 43.7 % (ref 34–46.6)
HDLC SERPL-MCNC: 53 MG/DL
HGB BLD-MCNC: 14.8 G/DL (ref 11.1–15.9)
HGB UR QL STRIP: ABNORMAL
IMM GRANULOCYTES # BLD AUTO: 0 X10E3/UL (ref 0–0.1)
IMM GRANULOCYTES NFR BLD AUTO: 0 %
KETONES UR QL STRIP: NEGATIVE
LDLC SERPL CALC-MCNC: 81 MG/DL (ref 0–99)
LEUKOCYTE ESTERASE UR QL STRIP: NEGATIVE
LYMPHOCYTES # BLD AUTO: 2.1 X10E3/UL (ref 0.7–3.1)
LYMPHOCYTES NFR BLD AUTO: 27 %
MCH RBC QN AUTO: 29.9 PG (ref 26.6–33)
MCHC RBC AUTO-ENTMCNC: 33.9 G/DL (ref 31.5–35.7)
MCV RBC AUTO: 88 FL (ref 79–97)
MICRO URNS: ABNORMAL
MONOCYTES # BLD AUTO: 0.8 X10E3/UL (ref 0.1–0.9)
MONOCYTES NFR BLD AUTO: 11 %
MUCOUS THREADS URNS QL MICRO: PRESENT
NEUTROPHILS # BLD AUTO: 4.6 X10E3/UL (ref 1.4–7)
NEUTROPHILS NFR BLD AUTO: 58 %
NITRITE UR QL STRIP: NEGATIVE
PH UR STRIP: 5 [PH] (ref 5–7.5)
PLATELET # BLD AUTO: 207 X10E3/UL (ref 150–450)
POTASSIUM SERPL-SCNC: 4.4 MMOL/L (ref 3.5–5.2)
PROT SERPL-MCNC: 6.7 G/DL (ref 6–8.5)
PROT UR QL STRIP: NEGATIVE
RBC # BLD AUTO: 4.95 X10E6/UL (ref 3.77–5.28)
RBC #/AREA URNS HPF: ABNORMAL /HPF (ref 0–2)
SODIUM SERPL-SCNC: 144 MMOL/L (ref 134–144)
SP GR UR: 1.02 (ref 1–1.03)
TRIGL SERPL-MCNC: 119 MG/DL (ref 0–149)
TSH SERPL DL<=0.005 MIU/L-ACNC: 0.88 UIU/ML (ref 0.45–4.5)
UROBILINOGEN UR STRIP-MCNC: 0.2 MG/DL (ref 0.2–1)
VLDLC SERPL CALC-MCNC: 24 MG/DL (ref 5–40)
WBC # BLD AUTO: 8 X10E3/UL (ref 3.4–10.8)
WBC #/AREA URNS HPF: ABNORMAL /HPF (ref 0–5)

## 2019-12-22 NOTE — PATIENT INSTRUCTIONS
Medicare Wellness Visit, Female The best way to live healthy is to have a lifestyle where you eat a well-balanced diet, exercise regularly, limit alcohol use, and quit all forms of tobacco/nicotine, if applicable. Regular preventive services are another way to keep healthy. Preventive services (vaccines, screening tests, monitoring & exams) can help personalize your care plan, which helps you manage your own care. Screening tests can find health problems at the earliest stages, when they are easiest to treat. [facility] follows the current, evidence-based guidelines published by the [facility] Damir Isaacs ([facility]) when recommending preventive services for our patients. Because we follow these guidelines, sometimes recommendations change over time as research supports it. (For example, mammograms used to be recommended annually. Even though Medicare will still pay for an annual mammogram, the newer guidelines recommend a mammogram every two years for women of average risk). Of course, you and your doctor may decide to screen more often for some diseases, based on your risk and your co-morbidities (chronic disease you are already diagnosed with). Preventive services for you include: - Medicare offers their members a free annual wellness visit, which is time for you and your primary care provider to discuss and plan for your preventive service needs. Take advantage of this benefit every year! 
-All adults over the age of 72 should receive the recommended pneumonia vaccines. Current USPSTF guidelines recommend a series of two vaccines for the best pneumonia protection.  
-All adults should have a flu vaccine yearly and a tetanus vaccine every 10 years.  
-All adults age 48 and older should receive the shingles vaccines (series of two vaccines). -All adults age 38-68 who are overweight should have a diabetes screening test once every three years. -All adults born between [birth year] and 1966 should be screened once for Hepatitis C. 
-Other screening tests and preventive services for persons with diabetes include: an eye exam to screen for diabetic retinopathy, a kidney function test, a foot exam, and stricter control over your cholesterol.  
-Cardiovascular screening for adults with routine risk involves an electrocardiogram (ECG) at intervals determined by your doctor.  
-Colorectal cancer screenings should be done for adults age 54-65 with no increased risk factors for colorectal cancer. There are a number of acceptable methods of screening for this type of cancer. Each test has its own benefits and drawbacks. Discuss with your doctor what is most appropriate for you during your annual wellness visit. The different tests include: colonoscopy (considered the best screening method), a fecal occult blood test, a fecal DNA test, and sigmoidoscopy. 
 
-A bone mass density test is recommended when a woman turns 65 to screen for osteoporosis. This test is only recommended one time, as a screening. Some providers will use this same test as a disease monitoring tool if you already have osteoporosis. -Breast cancer screenings are recommended every other year for women of normal risk, age 54-69. 
-Cervical cancer screenings for women over age 72 are only recommended with certain risk factors. Here is a list of your current Health Maintenance items (your personalized list of preventive services) with a due date: 
Health Maintenance Due Topic Date Due  
 Hepatitis C Test  1955  Mammogram  10/28/2004  Stool testing for trace blood  10/28/2004  Glaucoma Screening   10/28/2019  Shingles Vaccine (2 of 2) 12/06/2019

## 2020-01-13 ENCOUNTER — CLINICAL SUPPORT (OUTPATIENT)
Dept: INTERNAL MEDICINE CLINIC | Age: 65
End: 2020-01-13

## 2020-01-22 ENCOUNTER — OFFICE VISIT (OUTPATIENT)
Dept: INTERNAL MEDICINE CLINIC | Age: 65
End: 2020-01-22

## 2020-01-22 VITALS
OXYGEN SATURATION: 95 % | HEART RATE: 79 BPM | WEIGHT: 160 LBS | TEMPERATURE: 97.3 F | RESPIRATION RATE: 14 BRPM | HEIGHT: 64 IN | DIASTOLIC BLOOD PRESSURE: 77 MMHG | SYSTOLIC BLOOD PRESSURE: 116 MMHG | BODY MASS INDEX: 27.31 KG/M2

## 2020-01-22 DIAGNOSIS — G89.29 CHRONIC MIDLINE LOW BACK PAIN WITHOUT SCIATICA: Primary | ICD-10-CM

## 2020-01-22 DIAGNOSIS — R31.29 MICROSCOPIC HEMATURIA: ICD-10-CM

## 2020-01-22 DIAGNOSIS — E78.5 DYSLIPIDEMIA: ICD-10-CM

## 2020-01-22 DIAGNOSIS — M54.50 CHRONIC MIDLINE LOW BACK PAIN WITHOUT SCIATICA: Primary | ICD-10-CM

## 2020-01-22 DIAGNOSIS — E03.9 ACQUIRED HYPOTHYROIDISM: ICD-10-CM

## 2020-01-22 DIAGNOSIS — J43.1 PANLOBULAR EMPHYSEMA (HCC): ICD-10-CM

## 2020-01-22 NOTE — PROGRESS NOTES
Chief Complaint   Patient presents with    LOW BACK PAIN     1 month follow up      1. Have you been to the ER, urgent care clinic since your last visit? Hospitalized since your last visit? No    2. Have you seen or consulted any other health care providers outside of the [address] [place] [address] since your last visit? Include any pap smears or colon screening.  No

## 2020-01-24 LAB
APPEARANCE UR: CLEAR
BACTERIA #/AREA URNS HPF: ABNORMAL /[HPF]
BACTERIA UR CULT: NO GROWTH
BILIRUB UR QL STRIP: NEGATIVE
CASTS URNS QL MICRO: ABNORMAL /LPF
COLOR UR: YELLOW
EPI CELLS #/AREA URNS HPF: ABNORMAL /HPF (ref 0–10)
GLUCOSE UR QL: NEGATIVE
HGB UR QL STRIP: ABNORMAL
KETONES UR QL STRIP: NEGATIVE
LEUKOCYTE ESTERASE UR QL STRIP: ABNORMAL
MICRO URNS: ABNORMAL
MUCOUS THREADS URNS QL MICRO: PRESENT
NITRITE UR QL STRIP: NEGATIVE
PH UR STRIP: 5 [PH] (ref 5–7.5)
PROT UR QL STRIP: NEGATIVE
RBC #/AREA URNS HPF: ABNORMAL /HPF (ref 0–2)
SP GR UR: 1.02 (ref 1–1.03)
UROBILINOGEN UR STRIP-MCNC: 0.2 MG/DL (ref 0.2–1)
WBC #/AREA URNS HPF: ABNORMAL /HPF (ref 0–5)

## 2020-01-26 NOTE — PROGRESS NOTES
[address] [place] [address] and Primary Care  [place] [address]  [address] [place] [address]  Phone:  [phone]  Fax: [phone]       Chief Complaint   Patient presents with    LOW BACK PAIN     1 month follow up    . SUBJECTIVE:    Zach Couch is a 71 y.o. female Comes in for return visit continuing to complain of back pain. Unfortunately, she continues to smoke cigarettes. She has a past history of dyslipidemia. On her last lab work her UA showed microscopic hematuria. The question is raised whether or not this represents a UTI. She has no  symptoms currently. Finally, she has a past history of hypothyroidism in addition to her dyslipidemia. Current Outpatient Medications   Medication Sig Dispense Refill    atorvastatin (LIPITOR) 20 mg tablet Take 20 mg by mouth daily.  promethazine (PHENERGAN) 25 mg tablet Take 25 mg by mouth every six (6) hours as needed for Nausea.  predniSONE (DELTASONE) 20 mg tablet TAKE 1 TABLET BY MOUTH AS NEEDED 20 Tab 0    traMADol (ULTRAM) 50 mg tablet Take 1 Tab by mouth every six (6) hours as needed for Pain. Max Daily Amount: 200 mg. 40 Tab 3    baclofen (LIORESAL) 10 mg tablet TAKE 1 TABLET BY MOUTH TWICE DAILY 180 Tab 0    baclofen (LIORESAL) 10 mg tablet Take 1 Tab by mouth two (2) times a day. 180 Tab 3    gabapentin (NEURONTIN) 400 mg capsule Take 1 Cap by mouth three (3) times daily. 270 Cap 0    traMADol (ULTRAM) 50 mg tablet Take 1 Tab by mouth every six (6) hours as needed for Pain. Max Daily Amount: 200 mg. 40 Tab 1    lamoTRIgine (LAMICTAL) 25 mg tablet TK ONE TABLET PO D FOR 2 WEEKS THEN INCREASE TO ONE TABLET BID  0    DALIRESP tab tablet daily.  ibuprofen (MOTRIN) 800 mg tablet Take  by mouth.  busPIRone (BUSPAR) 5 mg tablet Take  by mouth two (2) times a day.  umeclidinium-vilanterol (ANORO ELLIPTA) 62.5-25 mcg/actuation inhaler Take 1 Puff by inhalation daily.       lidocaine (LIDODERM) 5 % 1 Patch by TransDERmal route every twenty-four (24) hours. 30 Each 3    codeine-butalbital-acetaminophen-caffeine (FIORICET WITH CODEINE) -83-30 mg per capsule Take 1 Cap by mouth every four (4) hours as needed for Headache. Max Daily Amount: 6 Caps. 40 Cap 1    buPROPion XL (WELLBUTRIN XL) 300 mg XL tablet TK 1 T PO QD  2    ergocalciferol (ERGOCALCIFEROL) 50,000 unit capsule TK 1 C PO TWICE A WEEK  AS DIRECTED  3    FLUVIRIN [id number] susp injection ADM 0.5ML IM UTD  0    levothyroxine (SYNTHROID) 88 mcg tablet Take 88 mcg by mouth Daily (before breakfast). 2    lisinopril (PRINIVIL, ZESTRIL) 10 mg tablet Take 10 mg by mouth daily. 5    QUEtiapine (SEROQUEL) 100 mg tablet Take 100 mg by mouth nightly.  ASPIRIN/SALICYLAMIDE/CAFFEINE (BC HEADACHE POWDER PO) Take  by mouth as needed.  METHYL SALICYLATE/MENTHOL (ICY HOT EX) by Apply Externally route daily.  SUMAtriptan (IMITREX) 100 mg tablet 1 tab at onset of moderate-severe migraine; may repeat 1 tab in 2 hours; Limit: 2 tabs in 24/ hrs, not more than 3 days a week 12 Tab 3    oxyCODONE-acetaminophen (PERCOCET 10)  mg per tablet Take 1 Tab by mouth every four (4) hours as needed for Pain. 60 Tab 0    venlafaxine (EFFEXOR) 75 mg tablet Take 25 mg by mouth two (2) times a day.  tiotropium (SPIRIVA WITH HANDIHALER) 18 mcg inhalation capsule Take 1 Cap by inhalation daily. Indications: CHRONIC OBSTRUCTIVE PULMONARY DISEASE WITH BRONCHOSPASMS      albuterol (PROVENTIL HFA, VENTOLIN HFA) 90 mcg/Actuation inhaler Take 2-3 Puffs by inhalation every six (6) hours as needed.  ipratropium (ATROVENT HFA) 17 mcg/Actuation inhaler Take 2-3 Puffs by inhalation every six (6) hours as needed.  albuterol-ipratropium (DUO-NEB) 0.5 mg-3 mg(2.5 mg base)/3 mL nebulizer solution 3 mL by Nebulization route every four (4) hours.  ibandronate (BONIVA) 150 mg tablet Take 150 mg by mouth every thirty (30) days.       OTHER,NON-FORMULARY, Take 80 mg by mouth as needed. \"Asacolec\"   Indications: CONSTIPATION      simvastatin (ZOCOR) 40 mg tablet Take 40 mg by mouth nightly.  diazepam (VALIUM) 5 mg tablet Take 5 mg by mouth daily as needed.  calcium-cholecalciferol, D3, (CALCIUM WITH VITAMIN D) tablet Take 1 Tab by mouth two (2) times a day.       VITAMIN B-12 500 mcg tablet TK 1 T PO QD  5     Past Medical History:   Diagnosis Date    Anxiety disorder     Arthritis     Bulging disc     Constipation     COPD     Cough     Depression     Diarrhea     Falls     Fatigue     Fibromyalgia     Frequent headaches     GERD (gastroesophageal reflux disease)     Hearing loss     Hypercholesteremia     Hypothyroidism     Incontinence     Joint pain     Leg swelling     Memory disorder     Muscle pain     Muscle weakness     Nausea and vomiting     Neuropathy     Osteopenia     Other ill-defined conditions(797.69)     fibromyalgia  osteopenia    Psychiatric disorder     Snoring     SOB (shortness of breath)     Stomach pain     Swallowing difficulty     Thyroid disease     hypothyroidism    Unspecified sleep apnea     Visual disturbance      Past Surgical History:   Procedure Laterality Date    HX GYN       No Known Allergies      REVIEW OF SYSTEMS:  General: negative for - chills or fever  ENT: negative for - headaches, nasal congestion or tinnitus  Respiratory: negative for - cough, hemoptysis, shortness of breath or wheezing  Cardiovascular : negative for - chest pain, edema, palpitations or shortness of breath  Gastrointestinal: negative for - abdominal pain, blood in stools, heartburn or nausea/vomiting  Genito-Urinary: no dysuria, trouble voiding, or hematuria  Musculoskeletal: negative for - gait disturbance, joint pain, joint stiffness or joint swelling  Neurological: no TIA or stroke symptoms  Hematologic: no bruises, no bleeding, no swollen glands  Integument: no lumps, mole changes, nail changes or rash  Endocrine: no malaise/lethargy or unexpected weight changes      Social History     Socioeconomic History    Marital status:      Spouse name: Not on file    Number of children: Not on file    Years of education: Not on file    Highest education level: Not on file   Tobacco Use    Smoking status: Current Every Day Smoker     Packs/day: 1.50     Years: 42.00     Pack years: 63.00    Smokeless tobacco: Never Used   Substance and Sexual Activity    Alcohol use: No     Family History   Problem Relation Age of Onset    Cancer Paternal Grandmother         breast    Emphysema Paternal Grandmother        OBJECTIVE:    Visit Vitals  /77   Pulse 79   Temp 97.3 °F (36.3 °C) (Oral)   Resp 14   Ht 5' 4\" (1.626 m)   Wt 160 lb (72.6 kg)   SpO2 95%   BMI 27.46 kg/m²     CONSTITUTIONAL: well , well nourished, appears age appropriate  EYES: perrla, eom intact  ENMT:moist mucous membranes, pharynx clear  NECK: supple. Thyroid normal  RESPIRATORY: Chest: clear to ascultation and percussion   CARDIOVASCULAR: Heart: regular rate and rhythm  GASTROINTESTINAL: Abdomen: soft, bowel sounds active  HEMATOLOGIC: no pathological lymph nodes palpated  MUSCULOSKELETAL: Extremities: no edema, pulse 1+   INTEGUMENT: No unusual rashes or suspicious skin lesions noted. Nails appear normal.  NEUROLOGIC: non-focal exam   MENTAL STATUS: alert and oriented, appropriate affect      ASSESSMENT:  1. Chronic midline low back pain without sciatica    2. Microscopic hematuria    3. Dyslipidemia    4. Panlobular emphysema ([address]    5. Acquired hypothyroidism        PLAN:    1. As far as her low back pain is concerned, there is not much that can I do for her. I will refer her to an orthopedic physician _______________. It is obvious based on her medications that she has had narcotics in the past for this. She does use a rollator. The definitive entity leading to her back pain has to be defined.   2. For her microscopic hematuria, UA and a urine culture will be obtained. If the urine culture is negative, the patient will be referred to urologist.  3. She will continue statin as prescribed. She has a significant increased cardiovascular risk created by her age and her existing comorbidities. 4. She needs to discontinue cigarette smoking. She does have emphysema. We talk at length about this. She is really not interested in discontinuing this at this point. 5. Her last TSH was excellent, no adjustments are made. She appears to be clinically and chemically euthyroid. .  Orders Placed This Encounter    CULTURE, URINE    URINALYSIS W/ RFLX MICROSCOPIC    MICROSCOPIC EXAMINATION    REFERRAL TO ORTHOPEDICS         Follow-up and Dispositions    · Return in about 3 months (around 4/22/2020).            Jayla Alonzo MD

## 2020-01-28 DIAGNOSIS — N20.0 KIDNEY STONES: Primary | ICD-10-CM

## 2020-02-06 ENCOUNTER — HOSPITAL ENCOUNTER (OUTPATIENT)
Dept: ULTRASOUND IMAGING | Age: 65
Discharge: HOME | End: 2020-02-06
Attending: INTERNAL MEDICINE
Payer: MEDICARE

## 2020-02-06 DIAGNOSIS — N20.0 KIDNEY STONES: ICD-10-CM

## 2020-02-06 PROCEDURE — 76770 US EXAM ABDO BACK WALL COMP: CPT

## 2020-05-14 RX ORDER — QUETIAPINE FUMARATE 100 MG/1
100 TABLET, FILM COATED ORAL
Qty: 30 TAB | Refills: 11 | Status: SHIPPED | OUTPATIENT
Start: 2020-05-14 | End: 2021-03-03 | Stop reason: SDUPTHER

## 2020-07-28 ENCOUNTER — HOSPITAL ENCOUNTER (OUTPATIENT)
Dept: GENERAL RADIOLOGY | Age: 65
Discharge: HOME | End: 2020-07-28
Attending: INTERNAL MEDICINE
Payer: MEDICARE

## 2020-07-28 ENCOUNTER — OFFICE VISIT (OUTPATIENT)
Dept: INTERNAL MEDICINE CLINIC | Age: 65
End: 2020-07-28

## 2020-07-28 ENCOUNTER — HOSPITAL ENCOUNTER (OUTPATIENT)
Dept: VASCULAR SURGERY | Age: 65
Discharge: HOME | End: 2020-07-28
Attending: INTERNAL MEDICINE
Payer: MEDICARE

## 2020-07-28 VITALS
BODY MASS INDEX: 29.86 KG/M2 | TEMPERATURE: 98.3 F | WEIGHT: 174.9 LBS | HEART RATE: 76 BPM | RESPIRATION RATE: 16 BRPM | OXYGEN SATURATION: 94 % | HEIGHT: 64 IN

## 2020-07-28 DIAGNOSIS — R09.89 BRUIT OF RIGHT CAROTID ARTERY: ICD-10-CM

## 2020-07-28 DIAGNOSIS — M81.0 AGE RELATED OSTEOPOROSIS, UNSPECIFIED PATHOLOGICAL FRACTURE PRESENCE: ICD-10-CM

## 2020-07-28 DIAGNOSIS — Z00.00 WELLNESS EXAMINATION: ICD-10-CM

## 2020-07-28 DIAGNOSIS — Z13.39 SCREENING FOR ALCOHOLISM: ICD-10-CM

## 2020-07-28 DIAGNOSIS — R26.9 GAIT DISTURBANCE: ICD-10-CM

## 2020-07-28 DIAGNOSIS — E03.9 ACQUIRED HYPOTHYROIDISM: ICD-10-CM

## 2020-07-28 DIAGNOSIS — J43.1 PANLOBULAR EMPHYSEMA (HCC): Primary | ICD-10-CM

## 2020-07-28 DIAGNOSIS — E78.5 DYSLIPIDEMIA: ICD-10-CM

## 2020-07-28 DIAGNOSIS — I10 ESSENTIAL HYPERTENSION: ICD-10-CM

## 2020-07-28 DIAGNOSIS — I65.23 BILATERAL CAROTID ARTERY STENOSIS: ICD-10-CM

## 2020-07-28 PROBLEM — I67.89 CEREBRAL MICROVASCULAR DISEASE: Status: ACTIVE | Noted: 2020-07-28

## 2020-07-28 LAB
LEFT CCA DIST DIAS: 17.9 CM/S
LEFT CCA DIST SYS: 87.8 CM/S
LEFT CCA PROX DIAS: 25.6 CM/S
LEFT CCA PROX SYS: 113.7 CM/S
LEFT ECA DIAS: 15.3 CM/S
LEFT ECA SYS: 64.5 CM/S
LEFT ICA DIST DIAS: 21.8 CM/S
LEFT ICA DIST SYS: 65.9 CM/S
LEFT ICA MID DIAS: 17.4 CM/S
LEFT ICA MID SYS: 52.7 CM/S
LEFT ICA PROX DIAS: 21.8 CM/S
LEFT ICA PROX SYS: 59.3 CM/S
LEFT ICA/CCA SYS: 0.8
LEFT SUBCLAVIAN DIAS: 0 CM/S
LEFT SUBCLAVIAN SYS: 199.2 CM/S
LEFT VERTEBRAL DIAS: 12.7 CM/S
LEFT VERTEBRAL SYS: 56.7 CM/S
RIGHT CCA DIST DIAS: 19.9 CM/S
RIGHT CCA DIST SYS: 82 CM/S
RIGHT CCA PROX DIAS: 14.4 CM/S
RIGHT CCA PROX SYS: 76.5 CM/S
RIGHT ECA DIAS: 10.8 CM/S
RIGHT ECA SYS: 78.4 CM/S
RIGHT ICA DIST DIAS: 16.5 CM/S
RIGHT ICA DIST SYS: 56 CM/S
RIGHT ICA MID DIAS: 16.3 CM/S
RIGHT ICA MID SYS: 60.1 CM/S
RIGHT ICA PROX DIAS: 12.6 CM/S
RIGHT ICA PROX SYS: 58.3 CM/S
RIGHT ICA/CCA SYS: 0.7
RIGHT SUBCLAVIAN DIAS: 0 CM/S
RIGHT SUBCLAVIAN SYS: 131.3 CM/S
RIGHT VERTEBRAL DIAS: 9.4 CM/S
RIGHT VERTEBRAL SYS: 31.1 CM/S

## 2020-07-28 PROCEDURE — 93880 EXTRACRANIAL BILAT STUDY: CPT

## 2020-07-28 NOTE — PROGRESS NOTES
Chief Complaint   Patient presents with    Annual Exam     Patient states that she needs tests for her insurance (annual wellness exam, fall prevention, and bladder control). 1. Have you been to the ER, urgent care clinic since your last visit? Hospitalized since your last visit? No    2. Have you seen or consulted any other health care providers outside of the [address] [place] [address] since your last visit? Include any pap smears or colon screening.  No

## 2020-07-29 ENCOUNTER — HOSPITAL ENCOUNTER (OUTPATIENT)
Dept: MAMMOGRAPHY | Age: 65
Discharge: HOME | End: 2020-07-29
Attending: INTERNAL MEDICINE
Payer: MEDICARE

## 2020-07-29 DIAGNOSIS — M81.0 AGE RELATED OSTEOPOROSIS, UNSPECIFIED PATHOLOGICAL FRACTURE PRESENCE: ICD-10-CM

## 2020-07-29 PROCEDURE — 77080 DXA BONE DENSITY AXIAL: CPT

## 2020-07-29 NOTE — PATIENT INSTRUCTIONS
Medicare Wellness Visit, Female The best way to live healthy is to have a lifestyle where you eat a well-balanced diet, exercise regularly, limit alcohol use, and quit all forms of tobacco/nicotine, if applicable. Regular preventive services are another way to keep healthy. Preventive services (vaccines, screening tests, monitoring & exams) can help personalize your care plan, which helps you manage your own care. Screening tests can find health problems at the earliest stages, when they are easiest to treat. [facility] follows the current, evidence-based guidelines published by the [facility] ([facility]) when recommending preventive services for our patients. Because we follow these guidelines, sometimes recommendations change over time as research supports it. (For example, mammograms used to be recommended annually. Even though Medicare will still pay for an annual mammogram, the newer guidelines recommend a mammogram every two years for women of average risk). Of course, you and your doctor may decide to screen more often for some diseases, based on your risk and your co-morbidities (chronic disease you are already diagnosed with). Preventive services for you include: - Medicare offers their members a free annual wellness visit, which is time for you and your primary care provider to discuss and plan for your preventive service needs. Take advantage of this benefit every year! 
-All adults over the age of 72 should receive the recommended pneumonia vaccines. Current USPSTF guidelines recommend a series of two vaccines for the best pneumonia protection.  
-All adults should have a flu vaccine yearly and a tetanus vaccine every 10 years.  
-All adults age 48 and older should receive the shingles vaccines (series of two vaccines). -All adults age 38-68 who are overweight should have a diabetes screening test once every three years. -All adults born between [birth year] and 1966 should be screened once for Hepatitis C. 
-Other screening tests and preventive services for persons with diabetes include: an eye exam to screen for diabetic retinopathy, a kidney function test, a foot exam, and stricter control over your cholesterol.  
-Cardiovascular screening for adults with routine risk involves an electrocardiogram (ECG) at intervals determined by your doctor.  
-Colorectal cancer screenings should be done for adults age 54-65 with no increased risk factors for colorectal cancer. There are a number of acceptable methods of screening for this type of cancer. Each test has its own benefits and drawbacks. Discuss with your doctor what is most appropriate for you during your annual wellness visit. The different tests include: colonoscopy (considered the best screening method), a fecal occult blood test, a fecal DNA test, and sigmoidoscopy. 
 
-A bone mass density test is recommended when a woman turns 65 to screen for osteoporosis. This test is only recommended one time, as a screening. Some providers will use this same test as a disease monitoring tool if you already have osteoporosis. -Breast cancer screenings are recommended every other year for women of normal risk, age 54-69. 
-Cervical cancer screenings for women over age 72 are only recommended with certain risk factors. Here is a list of your current Health Maintenance items (your personalized list of preventive services) with a due date: 
Health Maintenance Due Topic Date Due  
 Hepatitis C Test  1955  Mammogram  10/28/2004  Colon Cancer Stool Test  10/28/2004  Glaucoma Screening   10/28/2019

## 2020-07-29 NOTE — PROGRESS NOTES
[address] [facility]  [facility]  [address] [place] [address]  Phone:  [phone]  Fax: [phone]       Chief Complaint   Patient presents with    Annual Exam     Patient states that she needs tests for her insurance (annual wellness exam, fall prevention, and bladder control). .      SUBJECTIVE:    Dolores Villafana is a 71 y.o. female Comes in for follow up. Unfortunately, she smoked cigarettes and has continued to smoke, realizing that she needs to discontinue this habit. This is complicated by reactive airway disease. She has a past history of primary hypertension, dyslipidemia and hypothyroidism. Apparently she was taking Prolia every six months previously for osteopenia/osteoporosis, but needs a DEXA scan for further confirmation and anticipated continuation. She has a gait disturbance and is actively seen by neurology regarding this. I am not entirely sure of the etiology. Current Outpatient Medications   Medication Sig Dispense Refill    levothyroxine (SYNTHROID) 88 mcg tablet take 1 tablet by mouth once daily 90 Tab 3    baclofen (LIORESAL) 10 mg tablet take 1 tablet by mouth twice a day 180 Tab 3    predniSONE (DELTASONE) 10 mg tablet take 2 tablets by mouth once daily if needed for COPD 40 Tab 0    QUEtiapine (SEROquel) 100 mg tablet Take 1 Tab by mouth nightly. 30 Tab 11    ATROVENT HFA 17 mcg/actuation inhaler inhale 2 puffs by mouth four times a day 12.9 g 11    atorvastatin (LIPITOR) 20 mg tablet take 1 tablet by mouth at bedtime 90 Tab 3    PROAIR HFA 90 mcg/actuation inhaler inhale 2 puffs by mouth every 4 hours if needed 8.5 g 11    predniSONE (DELTASONE) 20 mg tablet TAKE 1 TABLET BY MOUTH AS NEEDED 20 Tab 0    baclofen (LIORESAL) 10 mg tablet Take 1 Tab by mouth two (2) times a day. 180 Tab 3    gabapentin (NEURONTIN) 400 mg capsule Take 1 Cap by mouth three (3) times daily.  270 Cap 0    lidocaine (LIDODERM) 5 % 1 Patch by TransDERmal route every twenty-four (24) hours. 30 Each 3    codeine-butalbital-acetaminophen-caffeine (FIORICET WITH CODEINE) -91-30 mg per capsule Take 1 Cap by mouth every four (4) hours as needed for Headache. Max Daily Amount: 6 Caps. 40 Cap 1    buPROPion XL (WELLBUTRIN XL) 300 mg XL tablet TK 1 T PO QD  2    lisinopril (PRINIVIL, ZESTRIL) 10 mg tablet Take 10 mg by mouth daily. 5    ASPIRIN/SALICYLAMIDE/CAFFEINE (BC HEADACHE POWDER PO) Take  by mouth as needed.  oxyCODONE-acetaminophen (PERCOCET 10)  mg per tablet Take 1 Tab by mouth every four (4) hours as needed for Pain. 60 Tab 0    albuterol-ipratropium (DUO-NEB) 0.5 mg-3 mg(2.5 mg base)/3 mL nebulizer solution 3 mL by Nebulization route every four (4) hours.  diazepam (VALIUM) 5 mg tablet Take 5 mg by mouth daily as needed.  promethazine (PHENERGAN) 25 mg tablet Take 25 mg by mouth every six (6) hours as needed for Nausea.  traMADol (ULTRAM) 50 mg tablet Take 1 Tab by mouth every six (6) hours as needed for Pain. Max Daily Amount: 200 mg. 40 Tab 3    traMADol (ULTRAM) 50 mg tablet Take 1 Tab by mouth every six (6) hours as needed for Pain. Max Daily Amount: 200 mg. 40 Tab 1    VITAMIN B-12 500 mcg tablet TK 1 T PO QD  5    lamoTRIgine (LAMICTAL) 25 mg tablet TK ONE TABLET PO D FOR 2 WEEKS THEN INCREASE TO ONE TABLET BID  0    DALIRESP tab tablet daily.  ibuprofen (MOTRIN) 800 mg tablet Take  by mouth.  busPIRone (BUSPAR) 5 mg tablet Take  by mouth two (2) times a day.  umeclidinium-vilanterol (ANORO ELLIPTA) 62.5-25 mcg/actuation inhaler Take 1 Puff by inhalation daily.  ergocalciferol (ERGOCALCIFEROL) 50,000 unit capsule TK 1 C PO TWICE A WEEK  AS DIRECTED  3    FLUVIRIN [id number] susp injection ADM 0.5ML IM UTD  0    METHYL SALICYLATE/MENTHOL (ICY HOT EX) by Apply Externally route daily.       SUMAtriptan (IMITREX) 100 mg tablet 1 tab at onset of moderate-severe migraine; may repeat 1 tab in 2 hours; Limit: 2 tabs in 24/ hrs, not more than 3 days a week 12 Tab 3    venlafaxine (EFFEXOR) 75 mg tablet Take 25 mg by mouth two (2) times a day.  tiotropium (SPIRIVA WITH HANDIHALER) 18 mcg inhalation capsule Take 1 Cap by inhalation daily. Indications: CHRONIC OBSTRUCTIVE PULMONARY DISEASE WITH BRONCHOSPASMS      ibandronate (BONIVA) 150 mg tablet Take 150 mg by mouth every thirty (30) days.  OTHER,NON-FORMULARY, Take 80 mg by mouth as needed. \"Asacolec\"   Indications: CONSTIPATION      simvastatin (ZOCOR) 40 mg tablet Take 40 mg by mouth nightly.  calcium-cholecalciferol, D3, (CALCIUM WITH VITAMIN D) tablet Take 1 Tab by mouth two (2) times a day.        Past Medical History:   Diagnosis Date    Arthritis     Bulging disc     Fibromyalgia     GERD (gastroesophageal reflux disease)     Hearing loss     Hypothyroidism     Incontinence     Memory disorder     Muscle weakness     Nausea and vomiting     Osteopenia     Psychiatric disorder     Swallowing difficulty     Thyroid disease     hypothyroidism    Unspecified sleep apnea      Past Surgical History:   Procedure Laterality Date    HX GYN       No Known Allergies      REVIEW OF SYSTEMS:  General: negative for - chills or fever  ENT: negative for - headaches, nasal congestion or tinnitus  Respiratory: negative for - cough, hemoptysis, shortness of breath or wheezing  Cardiovascular : negative for - chest pain, edema, palpitations or shortness of breath  Gastrointestinal: negative for - abdominal pain, blood in stools, heartburn or nausea/vomiting  Genito-Urinary: no dysuria, trouble voiding, or hematuria  Musculoskeletal: negative for - gait disturbance, joint pain, joint stiffness or joint swelling  Neurological: no TIA or stroke symptoms  Hematologic: no bruises, no bleeding, no swollen glands  Integument: no lumps, mole changes, nail changes or rash  Endocrine: no malaise/lethargy or unexpected weight changes      Social History     Socioeconomic History    Marital status:      Spouse name: Not on file    Number of children: Not on file    Years of education: Not on file    Highest education level: Not on file   Tobacco Use    Smoking status: Current Every Day Smoker     Packs/day: 1.50     Years: 42.00     Pack years: 63.00    Smokeless tobacco: Never Used   Substance and Sexual Activity    Alcohol use: No     Family History   Problem Relation Age of Onset    Cancer Paternal Grandmother         breast    Emphysema Paternal Grandmother        OBJECTIVE:    Visit Vitals  Pulse 76   Temp 98.3 °F (36.8 °C) (Oral)   Resp 16   Ht 5' 4\" (1.626 m)   Wt 174 lb 14.4 oz (79.3 kg)   SpO2 94%   BMI 30.02 kg/m²     CONSTITUTIONAL: well , well nourished, appears age appropriate  EYES: perrla, eom intact  ENMT:moist mucous membranes, pharynx clear  NECK: supple. Thyroid normal, no JVD, right carotid bruit  RESPIRATORY: Chest: clear to ascultation and percussion, decreased breath sounds bilaterally, increased AP diameter  CARDIOVASCULAR: Heart: regular rate and rhythm  GASTROINTESTINAL: Abdomen: soft, bowel sounds active  HEMATOLOGIC: no pathological lymph nodes palpated  MUSCULOSKELETAL: Extremities: no edema, pulse 1+   INTEGUMENT: No unusual rashes or suspicious skin lesions noted. Nails appear normal.  NEUROLOGIC: non-focal exam   MENTAL STATUS: alert and oriented, appropriate affect      ASSESSMENT:  1. Panlobular emphysema ([address]    2. Bruit of right carotid artery    3. Bilateral carotid artery stenosis    4. Essential hypertension    5. Dyslipidemia    6. Acquired hypothyroidism    7. Age related osteoporosis, unspecified pathological fracture presence    8. Gait disturbance        PLAN:    1. The patient is encouraged to discontinue cigarette smoking in view of her history of COPD, complicated by reactive airway disease. 2. She has a very loud right carotid bruit.  She has had a previous carotid doppler done and her stenosis is subclinical, probably in the 10-20% range at best. This is aggravated by her cigarette smoking. She remains on a statin. 3. Blood pressure is reasonable today, no adjustments are made. 4. She will continue her statin. Her last Apo-B level was quite stable. 5. She will continue her thyroid supplement. Last TSH was excellent. 6. DEXA scan will be obtained to determine the status of her bone density. 7. She uses a rollator for walking. She is actively seeing neurology regarding her gait disturbance. .  Orders Placed This Encounter   Saurav Wilson MD  This is the Subsequent Medicare Annual Wellness Exam, performed 12 months or more after the Initial AWV or the last Subsequent AWV    I have reviewed the patient's medical history in detail and updated the computerized patient record.      History     Patient Active Problem List   Diagnosis Code    Recurrent depression ([facility] F33.9    Bilateral carotid artery stenosis I65.23    Cerebral microvascular disease I67.9    Panlobular emphysema ([facility] J43.1    Bruit of right carotid artery R09.89    Essential hypertension I10    Dyslipidemia E78.5    Acquired hypothyroidism E03.9    Age related osteoporosis M81.0    Fibromyalgia M79.7    Psychiatric disorder F99    Gait disturbance R26.9     Past Medical History:   Diagnosis Date    Arthritis     Bulging disc     Fibromyalgia     GERD (gastroesophageal reflux disease)     Hearing loss     Hypothyroidism     Incontinence     Memory disorder     Muscle weakness     Nausea and vomiting     Osteopenia     Psychiatric disorder     Swallowing difficulty     Thyroid disease     hypothyroidism    Unspecified sleep apnea       Past Surgical History:   Procedure Laterality Date    HX GYN       Current Outpatient Medications   Medication Sig Dispense Refill    levothyroxine (SYNTHROID) 88 mcg tablet take 1 tablet by mouth once daily 90 Tab 3    baclofen (LIORESAL) 10 mg tablet take 1 tablet by mouth twice a day 180 Tab 3    predniSONE (DELTASONE) 10 mg tablet take 2 tablets by mouth once daily if needed for COPD 40 Tab 0    QUEtiapine (SEROquel) 100 mg tablet Take 1 Tab by mouth nightly. 30 Tab 11    ATROVENT HFA 17 mcg/actuation inhaler inhale 2 puffs by mouth four times a day 12.9 g 11    atorvastatin (LIPITOR) 20 mg tablet take 1 tablet by mouth at bedtime 90 Tab 3    PROAIR HFA 90 mcg/actuation inhaler inhale 2 puffs by mouth every 4 hours if needed 8.5 g 11    predniSONE (DELTASONE) 20 mg tablet TAKE 1 TABLET BY MOUTH AS NEEDED 20 Tab 0    baclofen (LIORESAL) 10 mg tablet Take 1 Tab by mouth two (2) times a day. 180 Tab 3    gabapentin (NEURONTIN) 400 mg capsule Take 1 Cap by mouth three (3) times daily. 270 Cap 0    lidocaine (LIDODERM) 5 % 1 Patch by TransDERmal route every twenty-four (24) hours. 30 Each 3    codeine-butalbital-acetaminophen-caffeine (FIORICET WITH CODEINE) -64-30 mg per capsule Take 1 Cap by mouth every four (4) hours as needed for Headache. Max Daily Amount: 6 Caps. 40 Cap 1    buPROPion XL (WELLBUTRIN XL) 300 mg XL tablet TK 1 T PO QD  2    lisinopril (PRINIVIL, ZESTRIL) 10 mg tablet Take 10 mg by mouth daily. 5    ASPIRIN/SALICYLAMIDE/CAFFEINE (BC HEADACHE POWDER PO) Take  by mouth as needed.  oxyCODONE-acetaminophen (PERCOCET 10)  mg per tablet Take 1 Tab by mouth every four (4) hours as needed for Pain. 60 Tab 0    albuterol-ipratropium (DUO-NEB) 0.5 mg-3 mg(2.5 mg base)/3 mL nebulizer solution 3 mL by Nebulization route every four (4) hours.  diazepam (VALIUM) 5 mg tablet Take 5 mg by mouth daily as needed.  promethazine (PHENERGAN) 25 mg tablet Take 25 mg by mouth every six (6) hours as needed for Nausea.  traMADol (ULTRAM) 50 mg tablet Take 1 Tab by mouth every six (6) hours as needed for Pain.  Max Daily Amount: 200 mg. 40 Tab 3    traMADol (ULTRAM) 50 mg tablet Take 1 Tab by mouth every six (6) hours as needed for Pain. Max Daily Amount: 200 mg. 40 Tab 1    VITAMIN B-12 500 mcg tablet TK 1 T PO QD  5    lamoTRIgine (LAMICTAL) 25 mg tablet TK ONE TABLET PO D FOR 2 WEEKS THEN INCREASE TO ONE TABLET BID  0    DALIRESP tab tablet daily.  ibuprofen (MOTRIN) 800 mg tablet Take  by mouth.  busPIRone (BUSPAR) 5 mg tablet Take  by mouth two (2) times a day.  umeclidinium-vilanterol (ANORO ELLIPTA) 62.5-25 mcg/actuation inhaler Take 1 Puff by inhalation daily.  ergocalciferol (ERGOCALCIFEROL) 50,000 unit capsule TK 1 C PO TWICE A WEEK  AS DIRECTED  3    FLUVIRIN [id number] susp injection ADM 0.5ML IM UTD  0    METHYL SALICYLATE/MENTHOL (ICY HOT EX) by Apply Externally route daily.  SUMAtriptan (IMITREX) 100 mg tablet 1 tab at onset of moderate-severe migraine; may repeat 1 tab in 2 hours; Limit: 2 tabs in 24/ hrs, not more than 3 days a week 12 Tab 3    venlafaxine (EFFEXOR) 75 mg tablet Take 25 mg by mouth two (2) times a day.  tiotropium (SPIRIVA WITH HANDIHALER) 18 mcg inhalation capsule Take 1 Cap by inhalation daily. Indications: CHRONIC OBSTRUCTIVE PULMONARY DISEASE WITH BRONCHOSPASMS      ibandronate (BONIVA) 150 mg tablet Take 150 mg by mouth every thirty (30) days.  OTHER,NON-FORMULARY, Take 80 mg by mouth as needed. \"Asacolec\"   Indications: CONSTIPATION      simvastatin (ZOCOR) 40 mg tablet Take 40 mg by mouth nightly.  calcium-cholecalciferol, D3, (CALCIUM WITH VITAMIN D) tablet Take 1 Tab by mouth two (2) times a day.        No Known Allergies    Family History   Problem Relation Age of Onset    Cancer Paternal Grandmother         breast    Emphysema Paternal Grandmother      Social History     Tobacco Use    Smoking status: Current Every Day Smoker     Packs/day: 1.50     Years: 42.00     Pack years: 63.00    Smokeless tobacco: Never Used   Substance Use Topics    Alcohol use: No       Depression Risk Factor Screening:     3 most recent PHQ Screens 10/23/2017   Little interest or pleasure in doing things Not at all   Feeling down, depressed, irritable, or hopeless Several days   Total Score PHQ 2 1       Alcohol Risk Factor Screening:   Do you average 1 drink per night or more than 7 drinks a week:  No    On any one occasion in the past three months have you have had more than 3 drinks containing alcohol:  No      Functional Ability and Level of Safety:   Hearing: Hearing is good. Activities of Daily Living: The home contains: no safety equipment. Patient does total self care     Ambulation: with mild difficulty     Fall Risk:  Fall Risk Assessment, last 12 mths 7/28/2020   Able to walk? Yes   Fall in past 12 months? No   Fall with injury? -   Number of falls in past 12 months -   Fall Risk Score -     Abuse Screen:  Patient is not abused       Cognitive Screening   Has your family/caregiver stated any concerns about your memory: no     Cognitive Screening: Per psychiatry    Patient Care Team   Patient Care Team:  Nico Griffin MD as PCP - General (Internal Medicine)  Nico Griffin MD as PCP - [facility] Empaneled Provider    Assessment/Plan   Education and counseling provided:  Are appropriate based on today's review and evaluation    Diagnoses and all orders for this visit:    1. Panlobular emphysema ([address]    2. Bruit of right carotid artery  -     DUPLEX CAROTID BILATERAL; Future    3. Bilateral carotid artery stenosis    4. Essential hypertension    5. Dyslipidemia    6. Acquired hypothyroidism    7. Age related osteoporosis, unspecified pathological fracture presence  -     DEXA BONE DENSITY STUDY AXIAL; Future    8.  Gait disturbance        Health Maintenance Due   Topic Date Due    Hepatitis C Screening  1955    Breast Cancer Screen Mammogram  10/28/2004    FOBT Q1Y Age 50-75  10/28/2004    GLAUCOMA SCREENING Q2Y  10/28/2019

## 2020-10-14 RX ORDER — UMECLIDINIUM BROMIDE AND VILANTEROL TRIFENATATE 62.5; 25 UG/1; UG/1
1 POWDER RESPIRATORY (INHALATION) DAILY
Qty: 3 INHALER | Refills: 3 | Status: SHIPPED | OUTPATIENT
Start: 2020-10-14 | End: 2021-12-24

## 2020-10-19 ENCOUNTER — OFFICE VISIT (OUTPATIENT)
Dept: INTERNAL MEDICINE CLINIC | Age: 65
End: 2020-10-19
Payer: MEDICARE

## 2020-10-19 VITALS
BODY MASS INDEX: 32.1 KG/M2 | DIASTOLIC BLOOD PRESSURE: 63 MMHG | HEART RATE: 72 BPM | SYSTOLIC BLOOD PRESSURE: 99 MMHG | HEIGHT: 64 IN | OXYGEN SATURATION: 93 % | TEMPERATURE: 98.2 F | RESPIRATION RATE: 14 BRPM | WEIGHT: 188 LBS

## 2020-10-19 DIAGNOSIS — I10 ESSENTIAL HYPERTENSION: ICD-10-CM

## 2020-10-19 DIAGNOSIS — F33.9 RECURRENT DEPRESSION (HCC): ICD-10-CM

## 2020-10-19 DIAGNOSIS — R26.9 GAIT DISTURBANCE: Primary | ICD-10-CM

## 2020-10-19 DIAGNOSIS — M79.7 FIBROMYALGIA: ICD-10-CM

## 2020-10-19 DIAGNOSIS — J43.1 PANLOBULAR EMPHYSEMA (HCC): ICD-10-CM

## 2020-10-19 DIAGNOSIS — E03.9 ACQUIRED HYPOTHYROIDISM: ICD-10-CM

## 2020-10-19 PROCEDURE — G8427 DOCREV CUR MEDS BY ELIG CLIN: HCPCS | Performed by: INTERNAL MEDICINE

## 2020-10-19 PROCEDURE — G8536 NO DOC ELDER MAL SCRN: HCPCS | Performed by: INTERNAL MEDICINE

## 2020-10-19 PROCEDURE — G9717 DOC PT DX DEP/BP F/U NT REQ: HCPCS | Performed by: INTERNAL MEDICINE

## 2020-10-19 PROCEDURE — 99215 OFFICE O/P EST HI 40 MIN: CPT | Performed by: INTERNAL MEDICINE

## 2020-10-19 PROCEDURE — 3017F COLORECTAL CA SCREEN DOC REV: CPT | Performed by: INTERNAL MEDICINE

## 2020-10-19 PROCEDURE — G8417 CALC BMI ABV UP PARAM F/U: HCPCS | Performed by: INTERNAL MEDICINE

## 2020-10-19 PROCEDURE — 1090F PRES/ABSN URINE INCON ASSESS: CPT | Performed by: INTERNAL MEDICINE

## 2020-10-19 PROCEDURE — 1101F PT FALLS ASSESS-DOCD LE1/YR: CPT | Performed by: INTERNAL MEDICINE

## 2020-10-19 NOTE — PROGRESS NOTES
Chief Complaint   Patient presents with    Other     Patient states she is in office to request orders for a \"hammer round\". 1. Have you been to the ER, urgent care clinic since your last visit? Hospitalized since your last visit? No    2. Have you seen or consulted any other health care providers outside of the [address] [place] [address] since your last visit? Include any pap smears or colon screening.  No

## 2020-11-01 NOTE — PROGRESS NOTES
[facility]  [place] [address]  [address] [place] [address]  Phone:  [phone]  Fax: [phone]       Chief Complaint   Patient presents with    Other     Patient states she is in office to request orders for a \"hammer round\". .      SUBJECTIVE:    Katelyn Momin is a 76 y.o. female comes in stating that she needs examination to obtain another motorized wheelchair. She states that she has had one previously and when I asked her how was she able to acquire it she stated that this was given to her because of her COPD. Apparently she was and has never been on oxygen. She apparently has a gait disturbance for which she uses a Rollator. She is actively followed by a neurologist at his work-up to date has been negative as far as an etiology. She has had physical therapy repetitively. She states that she is able to walk with a Rollator up to 15 to 20 feet. She does have a history of fibromyalgia hypothyroidism and recurrent depression. Current Outpatient Medications   Medication Sig Dispense Refill    umeclidinium-vilanteroL (Anoro Ellipta) 62.5-25 mcg/actuation inhaler Take 1 Puff by inhalation daily. 3 Inhaler 3    levothyroxine (SYNTHROID) 88 mcg tablet take 1 tablet by mouth once daily 90 Tab 3    baclofen (LIORESAL) 10 mg tablet take 1 tablet by mouth twice a day 180 Tab 3    QUEtiapine (SEROquel) 100 mg tablet Take 1 Tab by mouth nightly. 30 Tab 11    ATROVENT HFA 17 mcg/actuation inhaler inhale 2 puffs by mouth four times a day 12.9 g 11    atorvastatin (LIPITOR) 20 mg tablet take 1 tablet by mouth at bedtime 90 Tab 3    PROAIR HFA 90 mcg/actuation inhaler inhale 2 puffs by mouth every 4 hours if needed 8.5 g 11    promethazine (PHENERGAN) 25 mg tablet Take 25 mg by mouth every six (6) hours as needed for Nausea.  traMADol (ULTRAM) 50 mg tablet Take 1 Tab by mouth every six (6) hours as needed for Pain.  Max Daily Amount: 200 mg. 40 Tab 3    baclofen (LIORESAL) 10 mg tablet Take 1 Tab by mouth two (2) times a day. 180 Tab 3    gabapentin (NEURONTIN) 400 mg capsule Take 1 Cap by mouth three (3) times daily. 270 Cap 0    traMADol (ULTRAM) 50 mg tablet Take 1 Tab by mouth every six (6) hours as needed for Pain. Max Daily Amount: 200 mg. 40 Tab 1    VITAMIN B-12 500 mcg tablet TK 1 T PO QD  5    DALIRESP tab tablet daily.  ibuprofen (MOTRIN) 800 mg tablet Take  by mouth.  busPIRone (BUSPAR) 5 mg tablet Take  by mouth two (2) times a day.  lidocaine (LIDODERM) 5 % 1 Patch by TransDERmal route every twenty-four (24) hours. 30 Each 3    codeine-butalbital-acetaminophen-caffeine (FIORICET WITH CODEINE) -63-30 mg per capsule Take 1 Cap by mouth every four (4) hours as needed for Headache. Max Daily Amount: 6 Caps. 40 Cap 1    buPROPion XL (WELLBUTRIN XL) 300 mg XL tablet TK 1 T PO QD  2    lisinopril (PRINIVIL, ZESTRIL) 10 mg tablet Take 10 mg by mouth daily. 5    ASPIRIN/SALICYLAMIDE/CAFFEINE (BC HEADACHE POWDER PO) Take  by mouth as needed.  METHYL SALICYLATE/MENTHOL (ICY HOT EX) by Apply Externally route daily.  SUMAtriptan (IMITREX) 100 mg tablet 1 tab at onset of moderate-severe migraine; may repeat 1 tab in 2 hours; Limit: 2 tabs in 24/ hrs, not more than 3 days a week 12 Tab 3    oxyCODONE-acetaminophen (PERCOCET 10)  mg per tablet Take 1 Tab by mouth every four (4) hours as needed for Pain. 60 Tab 0    albuterol-ipratropium (DUO-NEB) 0.5 mg-3 mg(2.5 mg base)/3 mL nebulizer solution 3 mL by Nebulization route every four (4) hours.  simvastatin (ZOCOR) 40 mg tablet Take 40 mg by mouth nightly.  diazepam (VALIUM) 5 mg tablet Take 5 mg by mouth daily as needed.  calcium-cholecalciferol, D3, (CALCIUM WITH VITAMIN D) tablet Take 1 Tab by mouth two (2) times a day.       predniSONE (DELTASONE) 10 mg tablet 2 tablets twice a day for asthmatic flare 40 Tab 0    lamoTRIgine (LAMICTAL) 25 mg tablet TK ONE TABLET PO D FOR 2 WEEKS THEN INCREASE TO ONE TABLET BID  0    ergocalciferol (ERGOCALCIFEROL) 50,000 unit capsule TK 1 C PO TWICE A WEEK  AS DIRECTED  3    FLUVIRIN [id number] susp injection ADM 0.5ML IM UTD  0    venlafaxine (EFFEXOR) 75 mg tablet Take 25 mg by mouth two (2) times a day.  tiotropium (SPIRIVA WITH HANDIHALER) 18 mcg inhalation capsule Take 1 Cap by inhalation daily. Indications: CHRONIC OBSTRUCTIVE PULMONARY DISEASE WITH BRONCHOSPASMS      ibandronate (BONIVA) 150 mg tablet Take 150 mg by mouth every thirty (30) days.  OTHER,NON-FORMULARY, Take 80 mg by mouth as needed.  \"Asacolec\"   Indications: CONSTIPATION       Past Medical History:   Diagnosis Date    Arthritis     Bulging disc     Fibromyalgia     GERD (gastroesophageal reflux disease)     Hearing loss     Hypothyroidism     Incontinence     Memory disorder     Muscle weakness     Nausea and vomiting     Osteopenia     Psychiatric disorder     Swallowing difficulty     Thyroid disease     hypothyroidism    Unspecified sleep apnea      Past Surgical History:   Procedure Laterality Date    HX GYN       No Known Allergies      REVIEW OF SYSTEMS:  General: negative for - chills or fever  ENT: negative for - headaches, nasal congestion or tinnitus  Respiratory: negative for - cough, hemoptysis, shortness of breath or wheezing  Cardiovascular : negative for - chest pain, edema, palpitations or shortness of breath  Gastrointestinal: negative for - abdominal pain, blood in stools, heartburn or nausea/vomiting  Genito-Urinary: no dysuria, trouble voiding, or hematuria  Musculoskeletal: negative for - gait disturbance, joint pain, joint stiffness or joint swelling  Neurological: no TIA or stroke symptoms  Hematologic: no bruises, no bleeding, no swollen glands  Integument: no lumps, mole changes, nail changes or rash  Endocrine: no malaise/lethargy or unexpected weight changes      Social History     Socioeconomic History    Marital status:      Spouse name: Not on file    Number of children: Not on file    Years of education: Not on file    Highest education level: Not on file   Tobacco Use    Smoking status: Current Every Day Smoker     Packs/day: 1.50     Years: 42.00     Pack years: 63.00    Smokeless tobacco: Never Used   Substance and Sexual Activity    Alcohol use: No     Family History   Problem Relation Age of Onset    Cancer Paternal Grandmother         breast    Emphysema Paternal Grandmother        OBJECTIVE:    Visit Vitals  BP 99/63   Pulse 72   Temp 98.2 °F (36.8 °C) (Oral)   Resp 14   Ht 5' 4\" (1.626 m)   Wt 188 lb (85.3 kg)   SpO2 93%   BMI 32.27 kg/m²     CONSTITUTIONAL: well , well nourished, appears age appropriate  EYES: perrla, eom intact  ENMT:moist mucous membranes, pharynx clear  NECK: supple. Thyroid normal  RESPIRATORY: Chest: clear to ascultation and percussion, increased AP diameter, decreased breath sounds bilaterally  CARDIOVASCULAR: Heart: regular rate and rhythm  GASTROINTESTINAL: Abdomen: soft, bowel sounds active  HEMATOLOGIC: no pathological lymph nodes palpated  MUSCULOSKELETAL: Extremities: no edema, pulse 1+   INTEGUMENT: No unusual rashes or suspicious skin lesions noted. Nails appear normal.  NEUROLOGIC: non-focal exam, unsteady gait  MENTAL STATUS: alert and oriented, appropriate affect      ASSESSMENT:  1. Gait disturbance    2. Panlobular emphysema ([address]    3. Essential hypertension    4. Acquired hypothyroidism    5. Fibromyalgia    6. Recurrent depression ([address]        PLAN:  1. The patient has gait disturbance but is able to ambulate 15 to 20 feet with a use of a Rollator. 2.  She apparently has COPD but is not in respiratory failure requiring oxygen. Her sats today are 93%. . I did not obtain a walk study to determine if she desaturates. 3.  Her blood pressure is reasonable today. No adjustments are made  4.   She remains on thyroid supplement and a last TSH was reasonable. 5.  She does have a history of fibromyalgia which apparently manifest itself is polyarthralgias and polymyalgias. 6.  She does have a history of chronic depression and presumably sees a psychiatrist.  7.  I will attempt to complete her form for her motorized wheelchair. Follow-up and Dispositions    · Return in about 4 months (around 2/19/2021).            Wolfgang Medellin MD

## 2020-11-06 ENCOUNTER — APPOINTMENT (OUTPATIENT)
Dept: CT IMAGING | Age: 65
End: 2020-11-06
Attending: EMERGENCY MEDICINE
Payer: MEDICARE

## 2020-11-06 ENCOUNTER — HOSPITAL ENCOUNTER (EMERGENCY)
Age: 65
Discharge: HOME | End: 2020-11-06
Attending: EMERGENCY MEDICINE | Admitting: EMERGENCY MEDICINE
Payer: MEDICARE

## 2020-11-06 VITALS
SYSTOLIC BLOOD PRESSURE: 119 MMHG | TEMPERATURE: 98.4 F | DIASTOLIC BLOOD PRESSURE: 71 MMHG | RESPIRATION RATE: 18 BRPM | HEART RATE: 64 BPM | OXYGEN SATURATION: 94 %

## 2020-11-06 DIAGNOSIS — R10.30 LOWER ABDOMINAL PAIN: Primary | ICD-10-CM

## 2020-11-06 LAB
ALBUMIN SERPL-MCNC: 3 G/DL (ref 3.5–5)
ALBUMIN/GLOB SERPL: 0.8 {RATIO} (ref 1.1–2.2)
ALP SERPL-CCNC: 79 U/L (ref 45–117)
ALT SERPL-CCNC: 20 U/L (ref 12–78)
ANION GAP SERPL CALC-SCNC: 5 MMOL/L (ref 5–15)
APPEARANCE UR: ABNORMAL
AST SERPL-CCNC: 18 U/L (ref 15–37)
BACTERIA URNS QL MICRO: ABNORMAL /HPF
BASOPHILS # BLD: 0 K/UL (ref 0–0.1)
BASOPHILS NFR BLD: 0 % (ref 0–1)
BILIRUB SERPL-MCNC: 0.3 MG/DL (ref 0.2–1)
BILIRUB UR QL: NEGATIVE
BUN SERPL-MCNC: 8 MG/DL (ref 6–20)
BUN/CREAT SERPL: 8 (ref 12–20)
CALCIUM SERPL-MCNC: 8.9 MG/DL (ref 8.5–10.1)
CHLORIDE SERPL-SCNC: 108 MMOL/L (ref 97–108)
CO2 SERPL-SCNC: 28 MMOL/L (ref 21–32)
COLOR UR: ABNORMAL
COMMENT, HOLDF: NORMAL
CREAT SERPL-MCNC: 0.98 MG/DL (ref 0.55–1.02)
DIFFERENTIAL METHOD BLD: ABNORMAL
EOSINOPHIL # BLD: 0.4 K/UL (ref 0–0.4)
EOSINOPHIL NFR BLD: 4 % (ref 0–7)
EPITH CASTS URNS QL MICRO: ABNORMAL /LPF
ERYTHROCYTE [DISTWIDTH] IN BLOOD BY AUTOMATED COUNT: 13.8 % (ref 11.5–14.5)
GLOBULIN SER CALC-MCNC: 3.6 G/DL (ref 2–4)
GLUCOSE SERPL-MCNC: 101 MG/DL (ref 65–100)
GLUCOSE UR STRIP.AUTO-MCNC: NEGATIVE MG/DL
HCT VFR BLD AUTO: 42.8 % (ref 35–47)
HGB BLD-MCNC: 14 G/DL (ref 11.5–16)
HGB UR QL STRIP: NEGATIVE
IMM GRANULOCYTES # BLD AUTO: 0 K/UL
IMM GRANULOCYTES NFR BLD AUTO: 0 %
KETONES UR QL STRIP.AUTO: NEGATIVE MG/DL
LEUKOCYTE ESTERASE UR QL STRIP.AUTO: ABNORMAL
LYMPHOCYTES # BLD: 3.4 K/UL (ref 0.8–3.5)
LYMPHOCYTES NFR BLD: 35 % (ref 12–49)
MCH RBC QN AUTO: 31 PG (ref 26–34)
MCHC RBC AUTO-ENTMCNC: 32.7 G/DL (ref 30–36.5)
MCV RBC AUTO: 94.7 FL (ref 80–99)
METAMYELOCYTES NFR BLD MANUAL: 4 %
MONOCYTES # BLD: 0.5 K/UL (ref 0–1)
MONOCYTES NFR BLD: 5 % (ref 5–13)
MYELOCYTES NFR BLD MANUAL: 1 %
NEUTS BAND NFR BLD MANUAL: 2 % (ref 0–6)
NEUTS SEG # BLD: 4.9 K/UL (ref 1.8–8)
NEUTS SEG NFR BLD: 49 % (ref 32–75)
NITRITE UR QL STRIP.AUTO: NEGATIVE
NRBC # BLD: 0 K/UL (ref 0–0.01)
NRBC BLD-RTO: 0 PER 100 WBC
PH UR STRIP: 5.5 [PH] (ref 5–8)
PLATELET # BLD AUTO: 252 K/UL (ref 150–400)
PMV BLD AUTO: 10.2 FL (ref 8.9–12.9)
POTASSIUM SERPL-SCNC: 3.4 MMOL/L (ref 3.5–5.1)
PROT SERPL-MCNC: 6.6 G/DL (ref 6.4–8.2)
PROT UR STRIP-MCNC: NEGATIVE MG/DL
RBC # BLD AUTO: 4.52 M/UL (ref 3.8–5.2)
RBC #/AREA URNS HPF: ABNORMAL /HPF (ref 0–5)
RBC MORPH BLD: ABNORMAL
SAMPLES BEING HELD,HOLD: NORMAL
SODIUM SERPL-SCNC: 141 MMOL/L (ref 136–145)
SP GR UR REFRACTOMETRY: 1.02 (ref 1–1.03)
UR CULT HOLD, URHOLD: NORMAL
UROBILINOGEN UR QL STRIP.AUTO: 1 EU/DL (ref 0.2–1)
WBC # BLD AUTO: 9.7 K/UL (ref 3.6–11)
WBC MORPH BLD: ABNORMAL
WBC URNS QL MICRO: ABNORMAL /HPF (ref 0–4)

## 2020-11-06 PROCEDURE — 96375 TX/PRO/DX INJ NEW DRUG ADDON: CPT

## 2020-11-06 PROCEDURE — 99283 EMERGENCY DEPT VISIT LOW MDM: CPT

## 2020-11-06 PROCEDURE — 74011250636 HC RX REV CODE- 250/636: Performed by: EMERGENCY MEDICINE

## 2020-11-06 PROCEDURE — 74011000258 HC RX REV CODE- 258: Performed by: RADIOLOGY

## 2020-11-06 PROCEDURE — 36415 COLL VENOUS BLD VENIPUNCTURE: CPT

## 2020-11-06 PROCEDURE — 81001 URINALYSIS AUTO W/SCOPE: CPT

## 2020-11-06 PROCEDURE — 96374 THER/PROPH/DIAG INJ IV PUSH: CPT

## 2020-11-06 PROCEDURE — 74011000636 HC RX REV CODE- 636: Performed by: RADIOLOGY

## 2020-11-06 PROCEDURE — 80053 COMPREHEN METABOLIC PANEL: CPT

## 2020-11-06 PROCEDURE — 85025 COMPLETE CBC W/AUTO DIFF WBC: CPT

## 2020-11-06 PROCEDURE — 74177 CT ABD & PELVIS W/CONTRAST: CPT

## 2020-11-06 RX ORDER — ONDANSETRON 2 MG/ML
4 INJECTION INTRAMUSCULAR; INTRAVENOUS
Status: COMPLETED | OUTPATIENT
Start: 2020-11-06 | End: 2020-11-06

## 2020-11-06 RX ORDER — SODIUM CHLORIDE 0.9 % (FLUSH) 0.9 %
10 SYRINGE (ML) INJECTION
Status: COMPLETED | OUTPATIENT
Start: 2020-11-06 | End: 2020-11-06

## 2020-11-06 RX ORDER — FENTANYL CITRATE 50 UG/ML
50 INJECTION, SOLUTION INTRAMUSCULAR; INTRAVENOUS
Status: COMPLETED | OUTPATIENT
Start: 2020-11-06 | End: 2020-11-06

## 2020-11-06 RX ORDER — HYDROCODONE BITARTRATE AND ACETAMINOPHEN 5; 325 MG/1; MG/1
1 TABLET ORAL
Qty: 5 TAB | Refills: 0 | Status: SHIPPED | OUTPATIENT
Start: 2020-11-06 | End: 2020-11-09

## 2020-11-06 RX ADMIN — FENTANYL CITRATE 50 MCG: 50 INJECTION, SOLUTION INTRAMUSCULAR; INTRAVENOUS at 14:05

## 2020-11-06 RX ADMIN — IOPAMIDOL 100 ML: 755 INJECTION, SOLUTION INTRAVENOUS at 13:51

## 2020-11-06 RX ADMIN — ONDANSETRON 4 MG: 2 INJECTION INTRAMUSCULAR; INTRAVENOUS at 14:05

## 2020-11-06 RX ADMIN — Medication 10 ML: at 15:07

## 2020-11-06 RX ADMIN — SODIUM CHLORIDE 100 ML: 900 INJECTION, SOLUTION INTRAVENOUS at 15:07

## 2020-11-06 NOTE — ED TRIAGE NOTES
Triage: Pt arrives from home with CC of abdominal pain since friday. Pt has history of colitis and she reports this feels like a flair but it has lasted longer than usual. She also reports some red blood on the toilet paper when she wipes herself. No dark stool or blood in toilet after defecation.

## 2020-11-06 NOTE — DISCHARGE INSTRUCTIONS

## 2020-11-06 NOTE — ED PROVIDER NOTES
HPI .  Patient has a history of vertebral disc disease, arthritis, fibromyalgia, muscle weakness, osteopenia, hypothyroidism, sleep apnea, unspecified memory disorder, and incontinence. She uses a Rollator with ambulation. Patient reports multiple similar episodes in the past.  She says the symptoms usually go away after few days. She says she has colitis associated with constipation. She has had a colonoscopy in the past and is not sure whether she had diverticula. Patient started having lower abdominal pain a week ago which is constant. Initially she felt febrile and was having sweats. She is having a crampy constant abdominal pain. She vomited 1 week ago when the episode started but not in subsequent days. She denies dysuria and hematuria. She says she had rectal bloody pus passed yesterday. She endorses abdominal distention. She is not aware of any past abdominal surgeries. She does not take fiber long-term or MiraLAX. She is not taking any specific medications for constipation over the last week.     Past Medical History:   Diagnosis Date    Arthritis     Bulging disc     Fibromyalgia     GERD (gastroesophageal reflux disease)     Hearing loss     Hypothyroidism     Incontinence     Memory disorder     Muscle weakness     Nausea and vomiting     Osteopenia     Psychiatric disorder     Swallowing difficulty     Thyroid disease     hypothyroidism    Unspecified sleep apnea        Past Surgical History:   Procedure Laterality Date    HX GYN           Family History:   Problem Relation Age of Onset    Cancer Paternal Grandmother         breast    Emphysema Paternal Grandmother        Social History     Socioeconomic History    Marital status:      Spouse name: Not on file    Number of children: Not on file    Years of education: Not on file    Highest education level: Not on file   Occupational History    Not on file   Social Needs    Financial resource strain: Not on file    Food insecurity     Worry: Not on file     Inability: Not on file    Transportation needs     Medical: Not on file     Non-medical: Not on file   Tobacco Use    Smoking status: Current Every Day Smoker     Packs/day: 1.50     Years: 42.00     Pack years: 63.00    Smokeless tobacco: Never Used   Substance and Sexual Activity    Alcohol use: No    Drug use: Not on file    Sexual activity: Not on file   Lifestyle    Physical activity     Days per week: Not on file     Minutes per session: Not on file    Stress: Not on file   Relationships    Social connections     Talks on phone: Not on file     Gets together: Not on file     Attends Orthodox service: Not on file     Active member of club or organization: Not on file     Attends meetings of clubs or organizations: Not on file     Relationship status: Not on file    Intimate partner violence     Fear of current or ex partner: Not on file     Emotionally abused: Not on file     Physically abused: Not on file     Forced sexual activity: Not on file   Other Topics Concern    Not on file   Social History Narrative    Not on file         ALLERGIES: Patient has no known allergies. Review of Systems   Constitutional: Positive for appetite change, chills, diaphoresis and fever. HENT: Negative for congestion. Eyes: Negative for redness. Respiratory: Negative for shortness of breath. Cardiovascular: Positive for chest pain. Gastrointestinal: Positive for abdominal distention and abdominal pain. Genitourinary: Positive for difficulty urinating. Negative for dysuria. Neurological: Negative for speech difficulty. Psychiatric/Behavioral: Negative for agitation and behavioral problems. Vitals:    11/06/20 1233   BP: 104/67   Pulse: 74   Resp: 16   Temp: (!) 96.7 °F (35.9 °C)            Physical Exam  Vitals signs and nursing note reviewed. Constitutional:       Appearance: She is well-developed.    HENT:      Head: Normocephalic and atraumatic. Eyes:      Pupils: Pupils are equal, round, and reactive to light. Neck:      Musculoskeletal: Normal range of motion and neck supple. Cardiovascular:      Rate and Rhythm: Normal rate and regular rhythm. Heart sounds: Normal heart sounds. No murmur. No friction rub. No gallop. Pulmonary:      Effort: Pulmonary effort is normal. No respiratory distress. Breath sounds: No wheezing or rales. Abdominal:      Palpations: Abdomen is soft. Tenderness: There is abdominal tenderness in the right lower quadrant, suprapubic area and left lower quadrant. There is no rebound. Musculoskeletal: Normal range of motion. General: No tenderness. Skin:     Findings: No erythema. Neurological:      Mental Status: She is alert. Cranial Nerves: No cranial nerve deficit.       Comments: Motor; symmetric   Psychiatric:         Behavior: Behavior normal.          MDM       Procedures

## 2021-01-25 ENCOUNTER — TRANSCRIBE ORDER (OUTPATIENT)
Dept: SCHEDULING | Age: 66
End: 2021-01-25

## 2021-01-25 DIAGNOSIS — G95.19 NEUROGENIC CLAUDICATION (HCC): Primary | ICD-10-CM

## 2021-01-25 DIAGNOSIS — M54.16 LUMBAR RADICULOPATHY: ICD-10-CM

## 2021-01-25 DIAGNOSIS — M54.50 LOW BACK PAIN: ICD-10-CM

## 2021-02-05 RX ORDER — PREDNISONE 10 MG/1
TABLET ORAL
Qty: 40 TAB | Refills: 0 | Status: SHIPPED | OUTPATIENT
Start: 2021-02-05 | End: 2021-09-18

## 2021-02-09 ENCOUNTER — TRANSCRIBE ORDER (OUTPATIENT)
Dept: SCHEDULING | Age: 66
End: 2021-02-09

## 2021-02-09 DIAGNOSIS — M54.16 LUMBAR RADICULOPATHY: ICD-10-CM

## 2021-02-09 DIAGNOSIS — M54.50 LOW BACK PAIN: ICD-10-CM

## 2021-02-09 DIAGNOSIS — G95.19 NEUROGENIC CLAUDICATION (HCC): Primary | ICD-10-CM

## 2021-03-02 ENCOUNTER — HOSPITAL ENCOUNTER (OUTPATIENT)
Dept: MRI IMAGING | Age: 66
Discharge: HOME | End: 2021-03-02
Attending: PHYSICAL MEDICINE & REHABILITATION
Payer: MEDICARE

## 2021-03-02 DIAGNOSIS — G95.19 NEUROGENIC CLAUDICATION (HCC): ICD-10-CM

## 2021-03-02 DIAGNOSIS — M54.16 LUMBAR RADICULOPATHY: ICD-10-CM

## 2021-03-02 DIAGNOSIS — M54.50 LOW BACK PAIN: ICD-10-CM

## 2021-03-02 PROCEDURE — 72148 MRI LUMBAR SPINE W/O DYE: CPT

## 2021-03-18 RX ORDER — IPRATROPIUM BROMIDE 17 UG/1
AEROSOL, METERED RESPIRATORY (INHALATION)
Qty: 12.9 G | Refills: 11 | Status: SHIPPED | OUTPATIENT
Start: 2021-03-18 | End: 2021-06-01 | Stop reason: SDUPTHER

## 2021-05-28 DIAGNOSIS — Z76.0 MEDICATION REFILL: ICD-10-CM

## 2021-05-28 DIAGNOSIS — F99 PSYCHIATRIC DISORDER: Primary | ICD-10-CM

## 2021-05-29 RX ORDER — ATORVASTATIN CALCIUM 20 MG/1
TABLET, FILM COATED ORAL
Qty: 180 TABLET | Refills: 3 | Status: SHIPPED | OUTPATIENT
Start: 2021-05-29 | End: 2022-08-02

## 2021-05-29 RX ORDER — LISINOPRIL 10 MG/1
10 TABLET ORAL DAILY
Qty: 90 TABLET | Refills: 3 | Status: SHIPPED | OUTPATIENT
Start: 2021-05-29 | End: 2021-07-21 | Stop reason: SDUPTHER

## 2021-05-29 RX ORDER — DIAZEPAM 5 MG/1
5 TABLET ORAL
Qty: 90 TABLET | Refills: 0 | Status: SHIPPED | OUTPATIENT
Start: 2021-05-29

## 2021-05-29 RX ORDER — BUPROPION HYDROCHLORIDE 300 MG/1
TABLET ORAL
Qty: 90 TABLET | Refills: 3 | Status: SHIPPED | OUTPATIENT
Start: 2021-05-29

## 2021-05-29 RX ORDER — QUETIAPINE FUMARATE 100 MG/1
TABLET, FILM COATED ORAL
Qty: 90 TABLET | Refills: 3 | Status: SHIPPED | OUTPATIENT
Start: 2021-05-29 | End: 2021-06-01 | Stop reason: SDUPTHER

## 2021-05-29 RX ORDER — BACLOFEN 10 MG/1
TABLET ORAL
Qty: 180 TABLET | Refills: 3 | Status: SHIPPED | OUTPATIENT
Start: 2021-05-29

## 2021-05-29 RX ORDER — UMECLIDINIUM BROMIDE AND VILANTEROL TRIFENATATE 62.5; 25 UG/1; UG/1
1 POWDER RESPIRATORY (INHALATION) DAILY
Qty: 3 INHALER | Refills: 3 | Status: SHIPPED | OUTPATIENT
Start: 2021-05-29 | End: 2021-12-24

## 2021-06-01 RX ORDER — QUETIAPINE FUMARATE 100 MG/1
TABLET, FILM COATED ORAL
Qty: 90 TABLET | Refills: 3 | Status: SHIPPED | OUTPATIENT
Start: 2021-06-01 | End: 2022-06-06

## 2021-06-01 RX ORDER — IPRATROPIUM BROMIDE AND ALBUTEROL SULFATE 2.5; .5 MG/3ML; MG/3ML
3 SOLUTION RESPIRATORY (INHALATION) EVERY 4 HOURS
Qty: 150 NEBULE | Refills: 3 | Status: SHIPPED | OUTPATIENT
Start: 2021-06-01 | End: 2022-05-10

## 2021-06-01 RX ORDER — LEVOTHYROXINE SODIUM 88 UG/1
TABLET ORAL
Qty: 90 TABLET | Refills: 3 | Status: SHIPPED | OUTPATIENT
Start: 2021-06-01 | End: 2021-10-05 | Stop reason: SDUPTHER

## 2021-06-17 DIAGNOSIS — Z76.0 MEDICATION REFILL: ICD-10-CM

## 2021-06-17 RX ORDER — ALBUTEROL SULFATE 90 UG/1
AEROSOL, METERED RESPIRATORY (INHALATION)
Qty: 8.5 G | Refills: 11 | Status: SHIPPED | OUTPATIENT
Start: 2021-06-17 | End: 2021-12-16 | Stop reason: SDUPTHER

## 2021-06-17 RX ORDER — BACLOFEN 10 MG/1
TABLET ORAL
Qty: 180 TABLET | Refills: 3 | Status: SHIPPED | OUTPATIENT
Start: 2021-06-17 | End: 2022-10-01

## 2021-07-21 RX ORDER — LISINOPRIL 10 MG/1
10 TABLET ORAL DAILY
Qty: 90 TABLET | Refills: 3 | Status: SHIPPED | OUTPATIENT
Start: 2021-07-21 | End: 2022-10-01

## 2021-09-18 RX ORDER — PREDNISONE 10 MG/1
TABLET ORAL
Qty: 40 TABLET | Refills: 0 | Status: SHIPPED | OUTPATIENT
Start: 2021-09-18 | End: 2022-08-02

## 2021-10-19 ENCOUNTER — OFFICE VISIT (OUTPATIENT)
Dept: INTERNAL MEDICINE CLINIC | Age: 66
End: 2021-10-19
Payer: MEDICARE

## 2021-10-19 VITALS
DIASTOLIC BLOOD PRESSURE: 75 MMHG | OXYGEN SATURATION: 93 % | TEMPERATURE: 98.1 F | BODY MASS INDEX: 33.75 KG/M2 | WEIGHT: 197.7 LBS | SYSTOLIC BLOOD PRESSURE: 120 MMHG | HEART RATE: 66 BPM | HEIGHT: 64 IN | RESPIRATION RATE: 14 BRPM

## 2021-10-19 DIAGNOSIS — Z13.31 SCREENING FOR DEPRESSION: ICD-10-CM

## 2021-10-19 DIAGNOSIS — E78.5 DYSLIPIDEMIA: ICD-10-CM

## 2021-10-19 DIAGNOSIS — J45.20 MILD INTERMITTENT REACTIVE AIRWAY DISEASE WITHOUT COMPLICATION: ICD-10-CM

## 2021-10-19 DIAGNOSIS — E03.9 ACQUIRED HYPOTHYROIDISM: ICD-10-CM

## 2021-10-19 DIAGNOSIS — Z00.00 WELLNESS EXAMINATION: ICD-10-CM

## 2021-10-19 DIAGNOSIS — I10 ESSENTIAL HYPERTENSION: Primary | ICD-10-CM

## 2021-10-19 DIAGNOSIS — Z23 NEEDS FLU SHOT: ICD-10-CM

## 2021-10-19 DIAGNOSIS — J43.1 PANLOBULAR EMPHYSEMA (HCC): ICD-10-CM

## 2021-10-19 PROCEDURE — G8427 DOCREV CUR MEDS BY ELIG CLIN: HCPCS | Performed by: INTERNAL MEDICINE

## 2021-10-19 PROCEDURE — 1101F PT FALLS ASSESS-DOCD LE1/YR: CPT | Performed by: INTERNAL MEDICINE

## 2021-10-19 PROCEDURE — G9717 DOC PT DX DEP/BP F/U NT REQ: HCPCS | Performed by: INTERNAL MEDICINE

## 2021-10-19 PROCEDURE — G0439 PPPS, SUBSEQ VISIT: HCPCS | Performed by: INTERNAL MEDICINE

## 2021-10-19 PROCEDURE — G8417 CALC BMI ABV UP PARAM F/U: HCPCS | Performed by: INTERNAL MEDICINE

## 2021-10-19 PROCEDURE — G0008 ADMIN INFLUENZA VIRUS VAC: HCPCS | Performed by: INTERNAL MEDICINE

## 2021-10-19 PROCEDURE — G8754 DIAS BP LESS 90: HCPCS | Performed by: INTERNAL MEDICINE

## 2021-10-19 PROCEDURE — 3017F COLORECTAL CA SCREEN DOC REV: CPT | Performed by: INTERNAL MEDICINE

## 2021-10-19 PROCEDURE — G8536 NO DOC ELDER MAL SCRN: HCPCS | Performed by: INTERNAL MEDICINE

## 2021-10-19 PROCEDURE — 99214 OFFICE O/P EST MOD 30 MIN: CPT | Performed by: INTERNAL MEDICINE

## 2021-10-19 PROCEDURE — G8752 SYS BP LESS 140: HCPCS | Performed by: INTERNAL MEDICINE

## 2021-10-19 PROCEDURE — 90694 VACC AIIV4 NO PRSRV 0.5ML IM: CPT | Performed by: INTERNAL MEDICINE

## 2021-10-19 PROCEDURE — 1090F PRES/ABSN URINE INCON ASSESS: CPT | Performed by: INTERNAL MEDICINE

## 2021-10-19 RX ORDER — ARFORMOTEROL TARTRATE 15 UG/2ML
15 SOLUTION RESPIRATORY (INHALATION)
Status: CANCELLED | OUTPATIENT
Start: 2021-10-19

## 2021-10-19 NOTE — PROGRESS NOTES
Chief Complaint   Patient presents with   [address] [place] [address] Annual Wellness Visit         1. Have you been to the ER, urgent care clinic since your last visit? Hospitalized since your last visit? No    2. Have you seen or consulted any other health care providers outside of the [address] [place] [address] since your last visit? Include any pap smears or colon screening.  No

## 2021-10-20 LAB
ALBUMIN SERPL-MCNC: 3.4 G/DL (ref 3.5–5)
ALBUMIN/GLOB SERPL: 1.1 {RATIO} (ref 1.1–2.2)
ALP SERPL-CCNC: 64 U/L (ref 45–117)
ALT SERPL-CCNC: 19 U/L (ref 12–78)
AMORPH CRY URNS QL MICRO: ABNORMAL
ANION GAP SERPL CALC-SCNC: 2 MMOL/L (ref 5–15)
APPEARANCE UR: ABNORMAL
AST SERPL-CCNC: 14 U/L (ref 15–37)
BACTERIA URNS QL MICRO: NEGATIVE /HPF
BASOPHILS # BLD: 0.1 K/UL (ref 0–0.1)
BASOPHILS NFR BLD: 1 % (ref 0–1)
BILIRUB SERPL-MCNC: 0.2 MG/DL (ref 0.2–1)
BILIRUB UR QL: NEGATIVE
BUN SERPL-MCNC: 13 MG/DL (ref 6–20)
BUN/CREAT SERPL: 15 (ref 12–20)
CALCIUM SERPL-MCNC: 8.8 MG/DL (ref 8.5–10.1)
CHLORIDE SERPL-SCNC: 112 MMOL/L (ref 97–108)
CHOLEST SERPL-MCNC: 174 MG/DL
CO2 SERPL-SCNC: 28 MMOL/L (ref 21–32)
COLOR UR: ABNORMAL
CREAT SERPL-MCNC: 0.87 MG/DL (ref 0.55–1.02)
DIFFERENTIAL METHOD BLD: ABNORMAL
EOSINOPHIL # BLD: 0.3 K/UL (ref 0–0.4)
EOSINOPHIL NFR BLD: 4 % (ref 0–7)
EPITH CASTS URNS QL MICRO: ABNORMAL /LPF
ERYTHROCYTE [DISTWIDTH] IN BLOOD BY AUTOMATED COUNT: 13.8 % (ref 11.5–14.5)
GLOBULIN SER CALC-MCNC: 3.2 G/DL (ref 2–4)
GLUCOSE SERPL-MCNC: 88 MG/DL (ref 65–100)
GLUCOSE UR STRIP.AUTO-MCNC: NEGATIVE MG/DL
HCT VFR BLD AUTO: 46.3 % (ref 35–47)
HDLC SERPL-MCNC: 56 MG/DL
HDLC SERPL: 3.1 {RATIO} (ref 0–5)
HGB BLD-MCNC: 15 G/DL (ref 11.5–16)
HGB UR QL STRIP: ABNORMAL
IMM GRANULOCYTES # BLD AUTO: 0 K/UL (ref 0–0.04)
IMM GRANULOCYTES NFR BLD AUTO: 1 % (ref 0–0.5)
KETONES UR QL STRIP.AUTO: NEGATIVE MG/DL
LDLC SERPL CALC-MCNC: 90 MG/DL (ref 0–100)
LEUKOCYTE ESTERASE UR QL STRIP.AUTO: ABNORMAL
LYMPHOCYTES # BLD: 2.5 K/UL (ref 0.8–3.5)
LYMPHOCYTES NFR BLD: 36 % (ref 12–49)
MCH RBC QN AUTO: 31.2 PG (ref 26–34)
MCHC RBC AUTO-ENTMCNC: 32.4 G/DL (ref 30–36.5)
MCV RBC AUTO: 96.3 FL (ref 80–99)
MONOCYTES # BLD: 0.9 K/UL (ref 0–1)
MONOCYTES NFR BLD: 12 % (ref 5–13)
NEUTS SEG # BLD: 3.3 K/UL (ref 1.8–8)
NEUTS SEG NFR BLD: 46 % (ref 32–75)
NITRITE UR QL STRIP.AUTO: NEGATIVE
NRBC # BLD: 0 K/UL (ref 0–0.01)
NRBC BLD-RTO: 0 PER 100 WBC
PH UR STRIP: 5.5 [PH] (ref 5–8)
PLATELET # BLD AUTO: 169 K/UL (ref 150–400)
PMV BLD AUTO: 11.6 FL (ref 8.9–12.9)
POTASSIUM SERPL-SCNC: 4.2 MMOL/L (ref 3.5–5.1)
PROT SERPL-MCNC: 6.6 G/DL (ref 6.4–8.2)
PROT UR STRIP-MCNC: NEGATIVE MG/DL
RBC # BLD AUTO: 4.81 M/UL (ref 3.8–5.2)
RBC #/AREA URNS HPF: ABNORMAL /HPF (ref 0–5)
SODIUM SERPL-SCNC: 142 MMOL/L (ref 136–145)
SP GR UR REFRACTOMETRY: 1.02 (ref 1–1.03)
TRIGL SERPL-MCNC: 140 MG/DL (ref ?–150)
TSH SERPL DL<=0.05 MIU/L-ACNC: 2.41 UIU/ML (ref 0.36–3.74)
UROBILINOGEN UR QL STRIP.AUTO: 0.2 EU/DL (ref 0.2–1)
VLDLC SERPL CALC-MCNC: 28 MG/DL
WBC # BLD AUTO: 7 K/UL (ref 3.6–11)
WBC URNS QL MICRO: ABNORMAL /HPF (ref 0–4)

## 2021-10-21 LAB — APO B SERPL-MCNC: 83 MG/DL

## 2021-10-24 PROBLEM — J45.909 REACTIVE AIRWAY DISEASE: Status: ACTIVE | Noted: 2021-10-24

## 2021-10-24 RX ORDER — BUDESONIDE 1 MG/2ML
1000 INHALANT ORAL 2 TIMES DAILY
Qty: 60 EACH | Refills: 11 | Status: SHIPPED | OUTPATIENT
Start: 2021-10-24

## 2021-10-24 NOTE — PROGRESS NOTES
[address] [place] [address] and Primary Care  [facility]  [address] [place] [address]  Phone:  [phone]  Fax: [phone]       Chief Complaint   Patient presents with   [facility] Wellness Visit   . SUBJECTIVE:    Ge Malhotra is a 76 y.o. female comes in for yearly physical.  She states that she is basically doing reasonably well. Unfortunately, she continues to smoke cigarettes and as a result wheezes chronically. This is superimposed on her existing COPD. She has a past history of hypothyroidism, primary hypertension and dyslipidemia. She has a significant increase in her cardiovascular risk. Current Outpatient Medications   Medication Sig Dispense Refill    budesonide (PULMICORT) 1 mg/2 mL nbsp 2 mL by Nebulization route two (2) times a day. 60 Each 11    levothyroxine (SYNTHROID) 88 mcg tablet take 1 tablet by mouth once daily 90 Tablet 3    predniSONE (DELTASONE) 10 mg tablet take 2 tablet by mouth twice a day for ASTHMATIC FLARE 40 Tablet 0    lisinopriL (PRINIVIL, ZESTRIL) 10 mg tablet Take 1 Tablet by mouth daily. 90 Tablet 3    albuterol (PROVENTIL HFA, VENTOLIN HFA, PROAIR HFA) 90 mcg/actuation inhaler INHAle 2 puffs by mouth every 4 hours if needed 8.5 g 11    baclofen (LIORESAL) 10 mg tablet take 1 tablet by mouth twice a day 180 Tablet 3    ipratropium (Atrovent HFA) 17 mcg/actuation inhaler inhale 2 puffs by mouth four times a day 3 Inhaler 3    QUEtiapine (SEROquel) 100 mg tablet take 1 tablet by mouth NIGHTLY 90 Tablet 3    albuterol-ipratropium (DUO-NEB) 2.5 mg-0.5 mg/3 ml nebu 3 mL by Nebulization route every four (4) hours. 150 Nebule 3    atorvastatin (LIPITOR) 20 mg tablet take 1 tablet by mouth at bedtime 180 Tablet 3    baclofen (LIORESAL) 10 mg tablet take 1 tablet by mouth twice a day 180 Tablet 3    diazePAM (VALIUM) 5 mg tablet Take 1 Tablet by mouth daily as needed for Anxiety.  90 Tablet 0    buPROPion XL (WELLBUTRIN XL) 300 mg XL tablet TK 1 T PO QD 90 Tablet 3    umeclidinium-vilanteroL (Anoro Ellipta) 62.5-25 mcg/actuation inhaler Take 1 Puff by inhalation daily. 3 Inhaler 3    PEG 3350-Electrolytes (COLYTE;GOLYTELY) solr 1 quart p.o. every 30 minutes until good results 1 Bottle 0    umeclidinium-vilanteroL (Anoro Ellipta) 62.5-25 mcg/actuation inhaler Take 1 Puff by inhalation daily. 3 Inhaler 3    promethazine (PHENERGAN) 25 mg tablet Take 25 mg by mouth every six (6) hours as needed for Nausea.  baclofen (LIORESAL) 10 mg tablet Take 1 Tab by mouth two (2) times a day. 180 Tab 3    gabapentin (NEURONTIN) 400 mg capsule Take 1 Cap by mouth three (3) times daily. 270 Cap 0    traMADol (ULTRAM) 50 mg tablet Take 1 Tab by mouth every six (6) hours as needed for Pain. Max Daily Amount: 200 mg. 40 Tab 1    VITAMIN B-12 500 mcg tablet TK 1 T PO QD  5    DALIRESP tab tablet daily.  ibuprofen (MOTRIN) 800 mg tablet Take  by mouth.  busPIRone (BUSPAR) 5 mg tablet Take  by mouth two (2) times a day.  ASPIRIN/SALICYLAMIDE/CAFFEINE (BC HEADACHE POWDER PO) Take  by mouth as needed.  METHYL SALICYLATE/MENTHOL (ICY HOT EX) by Apply Externally route daily.  lidocaine (LIDODERM) 5 % 1 Patch by TransDERmal route every twenty-four (24) hours.  (Patient not taking: Reported on 10/19/2021) 30 Each 3     Past Medical History:   Diagnosis Date    Arthritis     Bulging disc     Fibromyalgia     GERD (gastroesophageal reflux disease)     Hearing loss     Hypothyroidism     Incontinence     Memory disorder     Muscle weakness     Nausea and vomiting     Osteopenia     Psychiatric disorder     Swallowing difficulty     Thyroid disease     hypothyroidism    Unspecified sleep apnea      Past Surgical History:   Procedure Laterality Date    HX GYN       No Known Allergies      REVIEW OF SYSTEMS:  General: negative for - chills or fever  ENT: negative for - headaches, nasal congestion or tinnitus  Respiratory: negative for - cough, hemoptysis, shortness of breath or wheezing  Cardiovascular : negative for - chest pain, edema, palpitations or shortness of breath  Gastrointestinal: negative for - abdominal pain, blood in stools, heartburn or nausea/vomiting  Genito-Urinary: no dysuria, trouble voiding, or hematuria  Musculoskeletal: negative for - gait disturbance, joint pain, joint stiffness or joint swelling  Neurological: no TIA or stroke symptoms  Hematologic: no bruises, no bleeding, no swollen glands  Integument: no lumps, mole changes, nail changes or rash  Endocrine: no malaise/lethargy or unexpected weight changes      Social History     Socioeconomic History    Marital status:      Spouse name: Not on file    Number of children: Not on file    Years of education: Not on file    Highest education level: Not on file   Tobacco Use    Smoking status: Current Every Day Smoker     Packs/day: 1.50     Years: 42.00     Pack years: 63.00    Smokeless tobacco: Never Used   Substance and Sexual Activity    Alcohol use: No     Social Determinants of Health     Financial Resource Strain:     Difficulty of Paying Living Expenses:    Food Insecurity:     Worried About Running Out of Food in the Last Year:     Ran Out of Food in the Last Year:    Transportation Needs:     Lack of Transportation (Medical):      Lack of Transportation (Non-Medical):    Physical Activity:     Days of Exercise per Week:     Minutes of Exercise per Session:    Stress:     Feeling of Stress :    Social Connections:     Frequency of Communication with Friends and Family:     Frequency of Social Gatherings with Friends and Family:     Attends Latter day Services:     Active Member of Clubs or Organizations:     Attends Club or Organization Meetings:     Marital Status:      Family History   Problem Relation Age of Onset    Cancer Paternal Grandmother         breast    Emphysema Paternal Grandmother        OBJECTIVE:    Visit Vitals  BP 120/75   Pulse 66   Temp 98.1 °F (36.7 °C) (Oral)   Resp 14   Ht 5' 4\" (1.626 m)   Wt 197 lb 11.2 oz (89.7 kg)   SpO2 93%   BMI 33.94 kg/m²     CONSTITUTIONAL: well , well nourished, appears age appropriate  EYES: perrla, eom intact  ENMT:moist mucous membranes, pharynx clear  NECK: supple. Thyroid normal  RESPIRATORY: Chest: clear to ascultation and percussion   CARDIOVASCULAR: Heart: regular rate and rhythm  GASTROINTESTINAL: Abdomen: soft, bowel sounds active  HEMATOLOGIC: no pathological lymph nodes palpated  MUSCULOSKELETAL: Extremities: no edema, pulse 1+   INTEGUMENT: No unusual rashes or suspicious skin lesions noted. Nails appear normal.  NEUROLOGIC: non-focal exam   MENTAL STATUS: alert and oriented, appropriate affect      ASSESSMENT:  1. Essential hypertension    2. Acquired hypothyroidism    3. Panlobular emphysema ([address]    4. Mild intermittent reactive airway disease without complication    5. Dyslipidemia    6. Screening for depression    7. Wellness examination        PLAN:  1. The patient's blood pressure is excellent today. No adjustments are made. 2. She does have a history of hypothyroidism, and TSH should be assessed for efficacy and compliance. 3. I encouraged her to discontinue cigarette smoking, particularly in view of her history of emphysema as well as her chronic reactive airway disease. 4. She has significant increased cardiovascular risk and remains on her statin. Efficacy and compliance will be assessed. .  Orders Placed This Encounter    ANNUAL DEPRESSION SCREEN 8-15 MIN    APOLIPOPROTEIN B    CBC WITH AUTOMATED DIFF    LIPID PANEL    METABOLIC PANEL, COMPREHENSIVE    TSH 3RD GENERATION    URINALYSIS W/ RFLX MICROSCOPIC    budesonide (PULMICORT) 1 mg/2 mL nbsp         Follow-up and Dispositions    · Return in about 6 months (around 4/19/2022).            Genna Armstrong MD  This is the Subsequent Medicare Annual Wellness Exam, performed 12 months or more after the Initial AWV or the last Subsequent AWV    I have reviewed the patient's medical history in detail and updated the computerized patient record. Assessment/Plan   Education and counseling provided:  Are appropriate based on today's review and evaluation    1. Essential hypertension  -     CBC WITH AUTOMATED DIFF; Future  -     COLLECTION VENOUS BLOOD,VENIPUNCTURE  -     METABOLIC PANEL, COMPREHENSIVE; Future  -     URINALYSIS W/ RFLX MICROSCOPIC; Future  2. Acquired hypothyroidism  -     TSH 3RD GENERATION; Future  3. Panlobular emphysema ([address]  4. Mild intermittent reactive airway disease without complication  -     budesonide (PULMICORT) 1 mg/2 mL nbsp; 2 mL by Nebulization route two (2) times a day., Normal, Disp-60 Each, R-11  5. Dyslipidemia  -     APOLIPOPROTEIN B; Future  -     LIPID PANEL; Future  6. Screening for depression  -     DEPRESSION SCREEN ANNUAL  7. Wellness examination       Depression Risk Factor Screening     3 most recent PHQ Screens 10/23/2017   Little interest or pleasure in doing things Not at all   Feeling down, depressed, irritable, or hopeless Several days   Total Score PHQ 2 1       Alcohol Risk Screen    Do you average more than 1 drink per night or more than 7 drinks a week:  No    On any one occasion in the past three months have you have had more than 3 drinks containing alcohol:  No        Functional Ability and Level of Safety    Hearing: Hearing is good. Activities of Daily Living: The home contains: no safety equipment. Patient does total self care      Ambulation: with no difficulty     Fall Risk:  Fall Risk Assessment, last 12 mths 7/28/2020   Able to walk? Yes   Fall in past 12 months? No   Number of falls in past 12 months -   Fall with injury?  -      Abuse Screen:  Patient is not abused       Cognitive Screening    Has your family/caregiver stated any concerns about your memory: no     Cognitive Screening: Not applicable    Health Maintenance Due     Health Maintenance Due   Topic Date Due    Hepatitis C Screening  Never done    COVID-19 Vaccine (1) Never done    DTaP/Tdap/Td series (1 - Tdap) Never done    Colorectal Cancer Screening Combo  Never done    Low dose CT lung screening  Never done    Breast Cancer Screen Mammogram  Never done    Shingrix Vaccine Age 50> (2 of 2) 12/06/2019    Flu Vaccine (1) 09/01/2021       Patient Care Team   Patient Care Team:  Dorian Loera MD as PCP - General (Internal Medicine)  Dorian Loear MD as PCP - [facility] Empaneled Provider    History     Patient Active Problem List   Diagnosis Code    Recurrent depression [facility]) F33.9    Bilateral carotid artery stenosis I65.23    Cerebral microvascular disease I67.89    Panlobular emphysema ([address] J43.1    Bruit of right carotid artery R09.89    Essential hypertension I10    Dyslipidemia E78.5    Acquired hypothyroidism E03.9    Age related osteoporosis M81.0    Fibromyalgia M79.7    Psychiatric disorder F99    Gait disturbance R26.9    Reactive airway disease J45.909     Past Medical History:   Diagnosis Date    Arthritis     Bulging disc     Fibromyalgia     GERD (gastroesophageal reflux disease)     Hearing loss     Hypothyroidism     Incontinence     Memory disorder     Muscle weakness     Nausea and vomiting     Osteopenia     Psychiatric disorder     Swallowing difficulty     Thyroid disease     hypothyroidism    Unspecified sleep apnea       Past Surgical History:   Procedure Laterality Date    HX GYN       Current Outpatient Medications   Medication Sig Dispense Refill    budesonide (PULMICORT) 1 mg/2 mL nbsp 2 mL by Nebulization route two (2) times a day. 60 Each 11    levothyroxine (SYNTHROID) 88 mcg tablet take 1 tablet by mouth once daily 90 Tablet 3    predniSONE (DELTASONE) 10 mg tablet take 2 tablet by mouth twice a day for ASTHMATIC FLARE 40 Tablet 0    lisinopriL (PRINIVIL, ZESTRIL) 10 mg tablet Take 1 Tablet by mouth daily.  80 Tablet 3    albuterol (PROVENTIL HFA, VENTOLIN HFA, PROAIR HFA) 90 mcg/actuation inhaler INHAle 2 puffs by mouth every 4 hours if needed 8.5 g 11    baclofen (LIORESAL) 10 mg tablet take 1 tablet by mouth twice a day 180 Tablet 3    ipratropium (Atrovent HFA) 17 mcg/actuation inhaler inhale 2 puffs by mouth four times a day 3 Inhaler 3    QUEtiapine (SEROquel) 100 mg tablet take 1 tablet by mouth NIGHTLY 90 Tablet 3    albuterol-ipratropium (DUO-NEB) 2.5 mg-0.5 mg/3 ml nebu 3 mL by Nebulization route every four (4) hours. 150 Nebule 3    atorvastatin (LIPITOR) 20 mg tablet take 1 tablet by mouth at bedtime 180 Tablet 3    baclofen (LIORESAL) 10 mg tablet take 1 tablet by mouth twice a day 180 Tablet 3    diazePAM (VALIUM) 5 mg tablet Take 1 Tablet by mouth daily as needed for Anxiety. 90 Tablet 0    buPROPion XL (WELLBUTRIN XL) 300 mg XL tablet TK 1 T PO QD 90 Tablet 3    umeclidinium-vilanteroL (Anoro Ellipta) 62.5-25 mcg/actuation inhaler Take 1 Puff by inhalation daily. 3 Inhaler 3    PEG 3350-Electrolytes (COLYTE;GOLYTELY) solr 1 quart p.o. every 30 minutes until good results 1 Bottle 0    umeclidinium-vilanteroL (Anoro Ellipta) 62.5-25 mcg/actuation inhaler Take 1 Puff by inhalation daily. 3 Inhaler 3    promethazine (PHENERGAN) 25 mg tablet Take 25 mg by mouth every six (6) hours as needed for Nausea.  baclofen (LIORESAL) 10 mg tablet Take 1 Tab by mouth two (2) times a day. 180 Tab 3    gabapentin (NEURONTIN) 400 mg capsule Take 1 Cap by mouth three (3) times daily. 270 Cap 0    traMADol (ULTRAM) 50 mg tablet Take 1 Tab by mouth every six (6) hours as needed for Pain. Max Daily Amount: 200 mg. 40 Tab 1    VITAMIN B-12 500 mcg tablet TK 1 T PO QD  5    DALIRESP tab tablet daily.  ibuprofen (MOTRIN) 800 mg tablet Take  by mouth.  busPIRone (BUSPAR) 5 mg tablet Take  by mouth two (2) times a day.  ASPIRIN/SALICYLAMIDE/CAFFEINE (BC HEADACHE POWDER PO) Take  by mouth as needed.  METHYL SALICYLATE/MENTHOL (ICY HOT EX) by Apply Externally route daily.  lidocaine (LIDODERM) 5 % 1 Patch by TransDERmal route every twenty-four (24) hours.  (Patient not taking: Reported on 10/19/2021) 30 Each 3     No Known Allergies    Family History   Problem Relation Age of Onset    Cancer Paternal Grandmother         breast    Emphysema Paternal Grandmother      Social History     Tobacco Use    Smoking status: Current Every Day Smoker     Packs/day: 1.50     Years: 42.00     Pack years: 63.00    Smokeless tobacco: Never Used   Substance Use Topics    Alcohol use: No         Devon Lopez MD

## 2021-10-24 NOTE — PATIENT INSTRUCTIONS
Medicare Wellness Visit, Female     The best way to live healthy is to have a lifestyle where you eat a well-balanced diet, exercise regularly, limit alcohol use, and quit all forms of tobacco/nicotine, if applicable. Regular preventive services are another way to keep healthy. Preventive services (vaccines, screening tests, monitoring & exams) can help personalize your care plan, which helps you manage your own care. Screening tests can find health problems at the earliest stages, when they are easiest to treat. [facility] follows the current, evidence-based guidelines published by the [facility] ([facility]) when recommending preventive services for our patients. Because we follow these guidelines, sometimes recommendations change over time as research supports it. (For example, mammograms used to be recommended annually. Even though Medicare will still pay for an annual mammogram, the newer guidelines recommend a mammogram every two years for women of average risk). Of course, you and your doctor may decide to screen more often for some diseases, based on your risk and your co-morbidities (chronic disease you are already diagnosed with). Preventive services for you include:  - Medicare offers their members a free annual wellness visit, which is time for you and your primary care provider to discuss and plan for your preventive service needs. Take advantage of this benefit every year!  -All adults over the age of 72 should receive the recommended pneumonia vaccines. Current USPSTF guidelines recommend a series of two vaccines for the best pneumonia protection.   -All adults should have a flu vaccine yearly and a tetanus vaccine every 10 years.   -All adults age 48 and older should receive the shingles vaccines (series of two vaccines).       -All adults age 38-68 who are overweight should have a diabetes screening test once every three years.   -All adults born between [birth year] and 1966 should be screened once for Hepatitis C.  -Other screening tests and preventive services for persons with diabetes include: an eye exam to screen for diabetic retinopathy, a kidney function test, a foot exam, and stricter control over your cholesterol.   -Cardiovascular screening for adults with routine risk involves an electrocardiogram (ECG) at intervals determined by your doctor.   -Colorectal cancer screenings should be done for adults age 54-65 with no increased risk factors for colorectal cancer. There are a number of acceptable methods of screening for this type of cancer. Each test has its own benefits and drawbacks. Discuss with your doctor what is most appropriate for you during your annual wellness visit. The different tests include: colonoscopy (considered the best screening method), a fecal occult blood test, a fecal DNA test, and sigmoidoscopy.    -A bone mass density test is recommended when a woman turns 65 to screen for osteoporosis. This test is only recommended one time, as a screening. Some providers will use this same test as a disease monitoring tool if you already have osteoporosis. -Breast cancer screenings are recommended every other year for women of normal risk, age 54-69.  -Cervical cancer screenings for women over age 72 are only recommended with certain risk factors.      Here is a list of your current Health Maintenance items (your personalized list of preventive services) with a due date:  Health Maintenance Due   Topic Date Due    Hepatitis C Test  Never done    COVID-19 Vaccine (1) Never done    DTaP/Tdap/Td  (1 - Tdap) Never done    Colorectal Screening  Never done    Smoker or Former Smoker - [address]  Never done    Mammogram  Never done    Shingles Vaccine (2 of 2) 12/06/2019    Yearly Flu Vaccine (1) 09/01/2021

## 2022-03-18 PROBLEM — J43.1 PANLOBULAR EMPHYSEMA (HCC): Status: ACTIVE | Noted: 2020-07-28

## 2022-03-18 PROBLEM — R26.9 GAIT DISTURBANCE: Status: ACTIVE | Noted: 2020-07-28

## 2022-03-18 PROBLEM — F33.9 RECURRENT DEPRESSION (HCC): Status: ACTIVE | Noted: 2017-12-26

## 2022-03-18 PROBLEM — R09.89 BRUIT OF RIGHT CAROTID ARTERY: Status: ACTIVE | Noted: 2020-07-28

## 2022-03-19 PROBLEM — I65.23 BILATERAL CAROTID ARTERY STENOSIS: Status: ACTIVE | Noted: 2020-07-28

## 2022-03-19 PROBLEM — M81.0 AGE RELATED OSTEOPOROSIS: Status: ACTIVE | Noted: 2020-07-28

## 2022-03-19 PROBLEM — E78.5 DYSLIPIDEMIA: Status: ACTIVE | Noted: 2020-07-28

## 2022-03-19 PROBLEM — E03.9 ACQUIRED HYPOTHYROIDISM: Status: ACTIVE | Noted: 2020-07-28

## 2022-03-19 PROBLEM — I10 ESSENTIAL HYPERTENSION: Status: ACTIVE | Noted: 2020-07-28

## 2022-03-20 PROBLEM — J45.909 REACTIVE AIRWAY DISEASE: Status: ACTIVE | Noted: 2021-10-24

## 2022-03-20 PROBLEM — I67.89 CEREBRAL MICROVASCULAR DISEASE: Status: ACTIVE | Noted: 2020-07-28

## 2022-10-01 DIAGNOSIS — Z76.0 MEDICATION REFILL: ICD-10-CM

## 2022-10-01 RX ORDER — BACLOFEN 10 MG/1
TABLET ORAL
Qty: 180 TABLET | Refills: 3 | Status: SHIPPED | OUTPATIENT
Start: 2022-10-01

## 2022-10-01 RX ORDER — LISINOPRIL 10 MG/1
TABLET ORAL
Qty: 90 TABLET | Refills: 3 | Status: SHIPPED | OUTPATIENT
Start: 2022-10-01

## 2022-12-25 RX ORDER — LEVOTHYROXINE SODIUM 88 UG/1
TABLET ORAL
Qty: 90 TABLET | Refills: 3 | Status: SHIPPED | OUTPATIENT
Start: 2022-12-25

## 2023-06-12 RX ORDER — PREDNISONE 10 MG/1
TABLET ORAL
Qty: 40 TABLET | OUTPATIENT
Start: 2023-06-12

## 2023-07-24 RX ORDER — ALBUTEROL SULFATE 90 UG/1
AEROSOL, METERED RESPIRATORY (INHALATION)
Qty: 25.5 G | Refills: 5 | Status: SHIPPED | OUTPATIENT
Start: 2023-07-24 | End: 2023-08-12 | Stop reason: SDUPTHER

## 2023-08-11 ENCOUNTER — OFFICE VISIT (OUTPATIENT)
Facility: CLINIC | Age: 68
End: 2023-08-11
Payer: MEDICARE

## 2023-08-11 VITALS
OXYGEN SATURATION: 96 % | DIASTOLIC BLOOD PRESSURE: 70 MMHG | BODY MASS INDEX: 29.02 KG/M2 | HEART RATE: 67 BPM | SYSTOLIC BLOOD PRESSURE: 101 MMHG | RESPIRATION RATE: 18 BRPM | WEIGHT: 170 LBS | TEMPERATURE: 98.3 F | HEIGHT: 64 IN

## 2023-08-11 DIAGNOSIS — N39.0 URINARY TRACT INFECTION WITHOUT HEMATURIA, SITE UNSPECIFIED: Primary | ICD-10-CM

## 2023-08-11 DIAGNOSIS — E78.5 DYSLIPIDEMIA: ICD-10-CM

## 2023-08-11 DIAGNOSIS — E03.9 ACQUIRED HYPOTHYROIDISM: ICD-10-CM

## 2023-08-11 DIAGNOSIS — I10 ESSENTIAL HYPERTENSION: ICD-10-CM

## 2023-08-11 DIAGNOSIS — J43.1 PANLOBULAR EMPHYSEMA (HCC): ICD-10-CM

## 2023-08-11 DIAGNOSIS — R26.9 GAIT DISTURBANCE: ICD-10-CM

## 2023-08-11 LAB
ALBUMIN SERPL-MCNC: 3.7 G/DL (ref 3.5–5)
ALBUMIN/GLOB SERPL: 1.2 (ref 1.1–2.2)
ALP SERPL-CCNC: 75 U/L (ref 45–117)
ALT SERPL-CCNC: 19 U/L (ref 12–78)
ANION GAP SERPL CALC-SCNC: 3 MMOL/L (ref 5–15)
APPEARANCE UR: CLEAR
AST SERPL-CCNC: 13 U/L (ref 15–37)
BACTERIA URNS QL MICRO: ABNORMAL /HPF
BASOPHILS # BLD: 0.1 K/UL (ref 0–0.1)
BASOPHILS NFR BLD: 1 % (ref 0–1)
BILIRUB SERPL-MCNC: 0.2 MG/DL (ref 0.2–1)
BILIRUB UR QL: NEGATIVE
BUN SERPL-MCNC: 14 MG/DL (ref 6–20)
BUN/CREAT SERPL: 18 (ref 12–20)
CALCIUM SERPL-MCNC: 8.9 MG/DL (ref 8.5–10.1)
CHLORIDE SERPL-SCNC: 111 MMOL/L (ref 97–108)
CHOLEST SERPL-MCNC: 204 MG/DL
CO2 SERPL-SCNC: 29 MMOL/L (ref 21–32)
COLOR UR: ABNORMAL
CREAT SERPL-MCNC: 0.79 MG/DL (ref 0.55–1.02)
CRP SERPL HS-MCNC: 1.3 MG/L
DIFFERENTIAL METHOD BLD: ABNORMAL
EOSINOPHIL # BLD: 0.3 K/UL (ref 0–0.4)
EOSINOPHIL NFR BLD: 4 % (ref 0–7)
EPITH CASTS URNS QL MICRO: ABNORMAL /LPF
ERYTHROCYTE [DISTWIDTH] IN BLOOD BY AUTOMATED COUNT: 14 % (ref 11.5–14.5)
GLOBULIN SER CALC-MCNC: 3.1 G/DL (ref 2–4)
GLUCOSE SERPL-MCNC: 80 MG/DL (ref 65–100)
GLUCOSE UR STRIP.AUTO-MCNC: NEGATIVE MG/DL
HCT VFR BLD AUTO: 49.4 % (ref 35–47)
HDLC SERPL-MCNC: 53 MG/DL
HDLC SERPL: 3.8 (ref 0–5)
HGB BLD-MCNC: 15.6 G/DL (ref 11.5–16)
HGB UR QL STRIP: ABNORMAL
HYALINE CASTS URNS QL MICRO: ABNORMAL /LPF (ref 0–5)
IMM GRANULOCYTES # BLD AUTO: 0.1 K/UL (ref 0–0.04)
IMM GRANULOCYTES NFR BLD AUTO: 1 % (ref 0–0.5)
KETONES UR QL STRIP.AUTO: NEGATIVE MG/DL
LDLC SERPL CALC-MCNC: 127 MG/DL (ref 0–100)
LEUKOCYTE ESTERASE UR QL STRIP.AUTO: ABNORMAL
LYMPHOCYTES # BLD: 2.8 K/UL (ref 0.8–3.5)
LYMPHOCYTES NFR BLD: 39 % (ref 12–49)
MCH RBC QN AUTO: 30.3 PG (ref 26–34)
MCHC RBC AUTO-ENTMCNC: 31.6 G/DL (ref 30–36.5)
MCV RBC AUTO: 95.9 FL (ref 80–99)
MONOCYTES # BLD: 0.7 K/UL (ref 0–1)
MONOCYTES NFR BLD: 10 % (ref 5–13)
NEUTS SEG # BLD: 3.3 K/UL (ref 1.8–8)
NEUTS SEG NFR BLD: 45 % (ref 32–75)
NITRITE UR QL STRIP.AUTO: NEGATIVE
NRBC # BLD: 0 K/UL (ref 0–0.01)
NRBC BLD-RTO: 0 PER 100 WBC
PH UR STRIP: 6.5 (ref 5–8)
PLATELET # BLD AUTO: 182 K/UL (ref 150–400)
PMV BLD AUTO: 11.9 FL (ref 8.9–12.9)
POTASSIUM SERPL-SCNC: 4.4 MMOL/L (ref 3.5–5.1)
PROT SERPL-MCNC: 6.8 G/DL (ref 6.4–8.2)
PROT UR STRIP-MCNC: NEGATIVE MG/DL
RBC # BLD AUTO: 5.15 M/UL (ref 3.8–5.2)
RBC #/AREA URNS HPF: ABNORMAL /HPF (ref 0–5)
SODIUM SERPL-SCNC: 143 MMOL/L (ref 136–145)
SP GR UR REFRACTOMETRY: 1.02 (ref 1–1.03)
TRIGL SERPL-MCNC: 120 MG/DL
TSH SERPL DL<=0.05 MIU/L-ACNC: 9.07 UIU/ML (ref 0.36–3.74)
UROBILINOGEN UR QL STRIP.AUTO: 0.2 EU/DL (ref 0.2–1)
VLDLC SERPL CALC-MCNC: 24 MG/DL
WBC # BLD AUTO: 7.3 K/UL (ref 3.6–11)
WBC URNS QL MICRO: ABNORMAL /HPF (ref 0–4)

## 2023-08-11 PROCEDURE — 3074F SYST BP LT 130 MM HG: CPT | Performed by: INTERNAL MEDICINE

## 2023-08-11 PROCEDURE — 3078F DIAST BP <80 MM HG: CPT | Performed by: INTERNAL MEDICINE

## 2023-08-11 PROCEDURE — G8399 PT W/DXA RESULTS DOCUMENT: HCPCS | Performed by: INTERNAL MEDICINE

## 2023-08-11 PROCEDURE — 36415 COLL VENOUS BLD VENIPUNCTURE: CPT | Performed by: INTERNAL MEDICINE

## 2023-08-11 PROCEDURE — 1090F PRES/ABSN URINE INCON ASSESS: CPT | Performed by: INTERNAL MEDICINE

## 2023-08-11 PROCEDURE — 4004F PT TOBACCO SCREEN RCVD TLK: CPT | Performed by: INTERNAL MEDICINE

## 2023-08-11 PROCEDURE — 3023F SPIROM DOC REV: CPT | Performed by: INTERNAL MEDICINE

## 2023-08-11 PROCEDURE — 1123F ACP DISCUSS/DSCN MKR DOCD: CPT | Performed by: INTERNAL MEDICINE

## 2023-08-11 PROCEDURE — G8417 CALC BMI ABV UP PARAM F/U: HCPCS | Performed by: INTERNAL MEDICINE

## 2023-08-11 PROCEDURE — 3017F COLORECTAL CA SCREEN DOC REV: CPT | Performed by: INTERNAL MEDICINE

## 2023-08-11 PROCEDURE — G8427 DOCREV CUR MEDS BY ELIG CLIN: HCPCS | Performed by: INTERNAL MEDICINE

## 2023-08-11 PROCEDURE — 99214 OFFICE O/P EST MOD 30 MIN: CPT | Performed by: INTERNAL MEDICINE

## 2023-08-11 RX ORDER — BUSPIRONE HYDROCHLORIDE 5 MG/1
5 TABLET ORAL 2 TIMES DAILY
Qty: 90 TABLET | Refills: 11 | Status: CANCELLED | OUTPATIENT
Start: 2023-08-11

## 2023-08-11 ASSESSMENT — PATIENT HEALTH QUESTIONNAIRE - PHQ9
SUM OF ALL RESPONSES TO PHQ QUESTIONS 1-9: 0
9. THOUGHTS THAT YOU WOULD BE BETTER OFF DEAD, OR OF HURTING YOURSELF: 0
SUM OF ALL RESPONSES TO PHQ QUESTIONS 1-9: 0
3. TROUBLE FALLING OR STAYING ASLEEP: 0
SUM OF ALL RESPONSES TO PHQ QUESTIONS 1-9: 0
10. IF YOU CHECKED OFF ANY PROBLEMS, HOW DIFFICULT HAVE THESE PROBLEMS MADE IT FOR YOU TO DO YOUR WORK, TAKE CARE OF THINGS AT HOME, OR GET ALONG WITH OTHER PEOPLE: 0
SUM OF ALL RESPONSES TO PHQ QUESTIONS 1-9: 0
6. FEELING BAD ABOUT YOURSELF - OR THAT YOU ARE A FAILURE OR HAVE LET YOURSELF OR YOUR FAMILY DOWN: 0
7. TROUBLE CONCENTRATING ON THINGS, SUCH AS READING THE NEWSPAPER OR WATCHING TELEVISION: 0
2. FEELING DOWN, DEPRESSED OR HOPELESS: 0
1. LITTLE INTEREST OR PLEASURE IN DOING THINGS: 0
4. FEELING TIRED OR HAVING LITTLE ENERGY: 0
SUM OF ALL RESPONSES TO PHQ9 QUESTIONS 1 & 2: 0
5. POOR APPETITE OR OVEREATING: 0
8. MOVING OR SPEAKING SO SLOWLY THAT OTHER PEOPLE COULD HAVE NOTICED. OR THE OPPOSITE, BEING SO FIGETY OR RESTLESS THAT YOU HAVE BEEN MOVING AROUND A LOT MORE THAN USUAL: 0

## 2023-08-11 ASSESSMENT — ANXIETY QUESTIONNAIRES
1. FEELING NERVOUS, ANXIOUS, OR ON EDGE: 0
GAD7 TOTAL SCORE: 0
2. NOT BEING ABLE TO STOP OR CONTROL WORRYING: 0
4. TROUBLE RELAXING: 0
IF YOU CHECKED OFF ANY PROBLEMS ON THIS QUESTIONNAIRE, HOW DIFFICULT HAVE THESE PROBLEMS MADE IT FOR YOU TO DO YOUR WORK, TAKE CARE OF THINGS AT HOME, OR GET ALONG WITH OTHER PEOPLE: NOT DIFFICULT AT ALL
3. WORRYING TOO MUCH ABOUT DIFFERENT THINGS: 0
7. FEELING AFRAID AS IF SOMETHING AWFUL MIGHT HAPPEN: 0
5. BEING SO RESTLESS THAT IT IS HARD TO SIT STILL: 0
6. BECOMING EASILY ANNOYED OR IRRITABLE: 0

## 2023-08-11 ASSESSMENT — LIFESTYLE VARIABLES
HOW OFTEN DO YOU HAVE A DRINK CONTAINING ALCOHOL: NEVER
HOW MANY STANDARD DRINKS CONTAINING ALCOHOL DO YOU HAVE ON A TYPICAL DAY: PATIENT DOES NOT DRINK

## 2023-08-12 LAB
BACTERIA SPEC CULT: NORMAL
SERVICE CMNT-IMP: NORMAL

## 2023-08-12 RX ORDER — ALBUTEROL SULFATE 90 UG/1
AEROSOL, METERED RESPIRATORY (INHALATION)
Qty: 54 G | Refills: 3 | Status: SHIPPED | OUTPATIENT
Start: 2023-08-12

## 2023-08-12 RX ORDER — LISINOPRIL 10 MG/1
10 TABLET ORAL DAILY
Qty: 90 TABLET | Refills: 3 | Status: SHIPPED | OUTPATIENT
Start: 2023-08-12

## 2023-08-12 RX ORDER — BUDESONIDE 1 MG/2ML
1000 INHALANT ORAL 2 TIMES DAILY
Qty: 120 ML | Refills: 11 | Status: SHIPPED | OUTPATIENT
Start: 2023-08-12

## 2023-08-12 RX ORDER — LEVOTHYROXINE SODIUM 88 UG/1
88 TABLET ORAL DAILY
Qty: 90 TABLET | Refills: 3 | Status: SHIPPED | OUTPATIENT
Start: 2023-08-12

## 2023-08-12 RX ORDER — BACLOFEN 10 MG/1
10 TABLET ORAL 2 TIMES DAILY
Qty: 90 TABLET | Refills: 5 | Status: SHIPPED | OUTPATIENT
Start: 2023-08-12

## 2023-08-12 RX ORDER — ATORVASTATIN CALCIUM 20 MG/1
20 TABLET, FILM COATED ORAL NIGHTLY
Qty: 90 TABLET | Refills: 3 | Status: SHIPPED | OUTPATIENT
Start: 2023-08-12

## 2023-08-12 NOTE — PROGRESS NOTES
Magdalena Schwartz is a 75 y.o. female    Chief Complaint   Patient presents with    Urinary Tract Infection    Medicare AWV     1. Have you been to the ER, urgent care clinic since your last visit? Hospitalized since your last visit? No    2. Have you seen or consulted any other health care providers outside of the [address] [place] [address] Avenue since your last visit? Include any pap smears or colon screening.  No
Status:   Future     Number of Occurrences:   1     Standing Expiration Date:   8/11/2024    Comprehensive Metabolic Panel     Standing Status:   Future     Number of Occurrences:   1     Standing Expiration Date:   8/11/2024    High sensitivity CRP     Standing Status:   Future     Number of Occurrences:   1     Standing Expiration Date:   8/11/2024    Lipid Panel     Standing Status:   Future     Number of Occurrences:   1     Standing Expiration Date:   8/11/2024    TSH     Standing Status:   Future     Number of Occurrences:   1     Standing Expiration Date:   8/11/2024    Urinalysis with Microscopic     Standing Status:   Future     Number of Occurrences:   1     Standing Expiration Date:   8/11/2024     Order Specific Question:   SPECIFY(EX-CATH,MIDSTREAM,CYSTO,ETC)? Answer:   midstream        Follow-up and Dispositions    Return in about 3 months (around 11/11/2023).              Burgess Félix MD

## 2023-08-14 LAB — APO B SERPL-MCNC: 108 MG/DL

## 2023-09-04 RX ORDER — CEFUROXIME AXETIL 250 MG/1
250 TABLET ORAL 2 TIMES DAILY
Qty: 20 TABLET | Refills: 0 | Status: SHIPPED | OUTPATIENT
Start: 2023-09-04 | End: 2023-09-14

## 2023-09-18 ENCOUNTER — NURSE TRIAGE (OUTPATIENT)
Dept: OTHER | Facility: CLINIC | Age: 68
End: 2023-09-18

## 2023-09-18 NOTE — TELEPHONE ENCOUNTER
----- Message from David Cam sent at 9/18/2023 12:36 PM EDT -----  Subject: Refill Request    QUESTIONS  Name of Medication? predniSONE (DELTASONE) 10 MG tablet  Patient-reported dosage and instructions? 10 mg  How many days do you have left? 2  Preferred Pharmacy? [id number]  Pharmacy phone number (if available)? [phone]  Additional Information for Provider? Pt stated she needs this for COPD. Pt   next appt is on 10/18.   ---------------------------------------------------------------------------  --------------  CALL BACK INFO  What is the best way for the office to contact you? OK to leave message on   voicemail  Preferred Call Back Phone Number? [phone]  ---------------------------------------------------------------------------  --------------  SCRIPT ANSWERS  Relationship to Patient?  Self
n/a

## 2023-09-18 NOTE — TELEPHONE ENCOUNTER
Location of patient: [address] [facility] [place] call from [place] [facility] with [facility]. Subjective: Caller states wants refill for Prednisone, h/o COPD,increased SOB in the last 3 weeks   [facility] [phone]    Triage not done-refuses appt or triage, wants Refill only     Outcome; Unable to reach [facility], contacted Gillian and [facility], she sent message with request to provider, asking provider to contact pt. Pt. Was informed message would be sent to provider. Attention Provider: Thank you for allowing me to participate in the care of your patient. The patient was connected to triage in response to information provided to the E[facility]/PSC. Please do not respond through this encounter as the response is not directed to a shared pool.     Reason for Disposition   NON-URGENT call redirected to PCP's office because it is open    Protocols used: No Contact or Duplicate Contact Call-ADULT-

## 2023-09-19 RX ORDER — PREDNISONE 10 MG/1
20 TABLET ORAL 2 TIMES DAILY
Qty: 28 TABLET | Refills: 0 | Status: SHIPPED | OUTPATIENT
Start: 2023-09-19 | End: 2023-09-22 | Stop reason: SDUPTHER

## 2023-09-21 ENCOUNTER — TELEPHONE (OUTPATIENT)
Facility: CLINIC | Age: 68
End: 2023-09-21

## 2023-09-21 NOTE — TELEPHONE ENCOUNTER
Called patient several times no answer letter sent to patient asking that she take antibiotic and cholesterol and thyroid meds

## 2023-09-21 NOTE — TELEPHONE ENCOUNTER
----- Message from Crystal Rivera MD sent at 9/4/2023  2:48 PM EDT -----  Tell patient she may have a urinary tract infection. Antibiotics will be called in. Remind her to take atorvastatin daily and her thyroid supplement every morning on an empty stomach.

## 2023-09-22 RX ORDER — PREDNISONE 10 MG/1
20 TABLET ORAL 2 TIMES DAILY
Qty: 28 TABLET | Refills: 0 | Status: SHIPPED | OUTPATIENT
Start: 2023-09-22

## 2023-11-06 ENCOUNTER — OFFICE VISIT (OUTPATIENT)
Facility: CLINIC | Age: 68
End: 2023-11-06
Payer: MEDICARE

## 2023-11-06 VITALS
HEIGHT: 64 IN | DIASTOLIC BLOOD PRESSURE: 79 MMHG | TEMPERATURE: 98.5 F | SYSTOLIC BLOOD PRESSURE: 145 MMHG | BODY MASS INDEX: 29.11 KG/M2 | RESPIRATION RATE: 18 BRPM | HEART RATE: 68 BPM | WEIGHT: 170.5 LBS | OXYGEN SATURATION: 98 %

## 2023-11-06 DIAGNOSIS — Z87.891 PERSONAL HISTORY OF TOBACCO USE: ICD-10-CM

## 2023-11-06 DIAGNOSIS — Z00.00 MEDICARE ANNUAL WELLNESS VISIT, SUBSEQUENT: ICD-10-CM

## 2023-11-06 DIAGNOSIS — G89.29 CHRONIC MIDLINE LOW BACK PAIN WITHOUT SCIATICA: ICD-10-CM

## 2023-11-06 DIAGNOSIS — F99 PSYCHIATRIC DISORDER: ICD-10-CM

## 2023-11-06 DIAGNOSIS — E03.9 ACQUIRED HYPOTHYROIDISM: ICD-10-CM

## 2023-11-06 DIAGNOSIS — Z12.31 SCREENING MAMMOGRAM, ENCOUNTER FOR: ICD-10-CM

## 2023-11-06 DIAGNOSIS — E78.5 DYSLIPIDEMIA: ICD-10-CM

## 2023-11-06 DIAGNOSIS — M54.50 CHRONIC MIDLINE LOW BACK PAIN WITHOUT SCIATICA: ICD-10-CM

## 2023-11-06 DIAGNOSIS — E66.3 OVERWEIGHT (BMI 25.0-29.9): ICD-10-CM

## 2023-11-06 DIAGNOSIS — I10 ESSENTIAL HYPERTENSION: Primary | ICD-10-CM

## 2023-11-06 DIAGNOSIS — J43.1 PANLOBULAR EMPHYSEMA (HCC): ICD-10-CM

## 2023-11-06 PROCEDURE — G8484 FLU IMMUNIZE NO ADMIN: HCPCS | Performed by: INTERNAL MEDICINE

## 2023-11-06 PROCEDURE — 1123F ACP DISCUSS/DSCN MKR DOCD: CPT | Performed by: INTERNAL MEDICINE

## 2023-11-06 PROCEDURE — 3074F SYST BP LT 130 MM HG: CPT | Performed by: INTERNAL MEDICINE

## 2023-11-06 PROCEDURE — 36415 COLL VENOUS BLD VENIPUNCTURE: CPT | Performed by: INTERNAL MEDICINE

## 2023-11-06 PROCEDURE — G0296 VISIT TO DETERM LDCT ELIG: HCPCS | Performed by: INTERNAL MEDICINE

## 2023-11-06 PROCEDURE — 3078F DIAST BP <80 MM HG: CPT | Performed by: INTERNAL MEDICINE

## 2023-11-06 PROCEDURE — 3017F COLORECTAL CA SCREEN DOC REV: CPT | Performed by: INTERNAL MEDICINE

## 2023-11-06 PROCEDURE — G0439 PPPS, SUBSEQ VISIT: HCPCS | Performed by: INTERNAL MEDICINE

## 2023-11-06 SDOH — ECONOMIC STABILITY: FOOD INSECURITY: WITHIN THE PAST 12 MONTHS, THE FOOD YOU BOUGHT JUST DIDN'T LAST AND YOU DIDN'T HAVE MONEY TO GET MORE.: NEVER TRUE

## 2023-11-06 SDOH — ECONOMIC STABILITY: TRANSPORTATION INSECURITY
IN THE PAST 12 MONTHS, HAS THE LACK OF TRANSPORTATION KEPT YOU FROM MEDICAL APPOINTMENTS OR FROM GETTING MEDICATIONS?: NO

## 2023-11-06 SDOH — HEALTH STABILITY: PHYSICAL HEALTH: ON AVERAGE, HOW MANY MINUTES DO YOU ENGAGE IN EXERCISE AT THIS LEVEL?: 0 MIN

## 2023-11-06 SDOH — ECONOMIC STABILITY: TRANSPORTATION INSECURITY
IN THE PAST 12 MONTHS, HAS LACK OF TRANSPORTATION KEPT YOU FROM MEETINGS, WORK, OR FROM GETTING THINGS NEEDED FOR DAILY LIVING?: NO

## 2023-11-06 SDOH — ECONOMIC STABILITY: FOOD INSECURITY: WITHIN THE PAST 12 MONTHS, YOU WORRIED THAT YOUR FOOD WOULD RUN OUT BEFORE YOU GOT MONEY TO BUY MORE.: NEVER TRUE

## 2023-11-06 SDOH — ECONOMIC STABILITY: INCOME INSECURITY: IN THE LAST 12 MONTHS, WAS THERE A TIME WHEN YOU WERE NOT ABLE TO PAY THE MORTGAGE OR RENT ON TIME?: NO

## 2023-11-06 SDOH — ECONOMIC STABILITY: HOUSING INSECURITY: IN THE LAST 12 MONTHS, HOW MANY PLACES HAVE YOU LIVED?: 1

## 2023-11-06 SDOH — HEALTH STABILITY: PHYSICAL HEALTH: ON AVERAGE, HOW MANY DAYS PER WEEK DO YOU ENGAGE IN MODERATE TO STRENUOUS EXERCISE (LIKE A BRISK WALK)?: 0 DAYS

## 2023-11-06 SDOH — ECONOMIC STABILITY: HOUSING INSECURITY
IN THE LAST 12 MONTHS, WAS THERE A TIME WHEN YOU DID NOT HAVE A STEADY PLACE TO SLEEP OR SLEPT IN A SHELTER (INCLUDING NOW)?: NO

## 2023-11-06 ASSESSMENT — ANXIETY QUESTIONNAIRES
1. FEELING NERVOUS, ANXIOUS, OR ON EDGE: 3
2. NOT BEING ABLE TO STOP OR CONTROL WORRYING: 1
7. FEELING AFRAID AS IF SOMETHING AWFUL MIGHT HAPPEN: 0
4. TROUBLE RELAXING: 1
3. WORRYING TOO MUCH ABOUT DIFFERENT THINGS: 1
GAD7 TOTAL SCORE: 7
6. BECOMING EASILY ANNOYED OR IRRITABLE: 1
5. BEING SO RESTLESS THAT IT IS HARD TO SIT STILL: 0
IF YOU CHECKED OFF ANY PROBLEMS ON THIS QUESTIONNAIRE, HOW DIFFICULT HAVE THESE PROBLEMS MADE IT FOR YOU TO DO YOUR WORK, TAKE CARE OF THINGS AT HOME, OR GET ALONG WITH OTHER PEOPLE: SOMEWHAT DIFFICULT

## 2023-11-06 ASSESSMENT — PATIENT HEALTH QUESTIONNAIRE - PHQ9
SUM OF ALL RESPONSES TO PHQ QUESTIONS 1-9: 18
SUM OF ALL RESPONSES TO PHQ QUESTIONS 1-9: 19
SUM OF ALL RESPONSES TO PHQ QUESTIONS 1-9: 19
7. TROUBLE CONCENTRATING ON THINGS, SUCH AS READING THE NEWSPAPER OR WATCHING TELEVISION: 3
10. IF YOU CHECKED OFF ANY PROBLEMS, HOW DIFFICULT HAVE THESE PROBLEMS MADE IT FOR YOU TO DO YOUR WORK, TAKE CARE OF THINGS AT HOME, OR GET ALONG WITH OTHER PEOPLE: 1
2. FEELING DOWN, DEPRESSED OR HOPELESS: 3
9. THOUGHTS THAT YOU WOULD BE BETTER OFF DEAD, OR OF HURTING YOURSELF: 1
6. FEELING BAD ABOUT YOURSELF - OR THAT YOU ARE A FAILURE OR HAVE LET YOURSELF OR YOUR FAMILY DOWN: 0
4. FEELING TIRED OR HAVING LITTLE ENERGY: 3
1. LITTLE INTEREST OR PLEASURE IN DOING THINGS: 3
8. MOVING OR SPEAKING SO SLOWLY THAT OTHER PEOPLE COULD HAVE NOTICED. OR THE OPPOSITE, BEING SO FIGETY OR RESTLESS THAT YOU HAVE BEEN MOVING AROUND A LOT MORE THAN USUAL: 3
3. TROUBLE FALLING OR STAYING ASLEEP: 3
SUM OF ALL RESPONSES TO PHQ9 QUESTIONS 1 & 2: 6
SUM OF ALL RESPONSES TO PHQ QUESTIONS 1-9: 19
5. POOR APPETITE OR OVEREATING: 0

## 2023-11-06 ASSESSMENT — SOCIAL DETERMINANTS OF HEALTH (SDOH)
WITHIN THE LAST YEAR, HAVE TO BEEN RAPED OR FORCED TO HAVE ANY KIND OF SEXUAL ACTIVITY BY YOUR PARTNER OR EX-PARTNER?: NO
HOW OFTEN DO YOU GET TOGETHER WITH FRIENDS OR RELATIVES?: MORE THAN THREE TIMES A WEEK
DO YOU BELONG TO ANY CLUBS OR ORGANIZATIONS SUCH AS CHURCH GROUPS UNIONS, FRATERNAL OR ATHLETIC GROUPS, OR SCHOOL GROUPS?: NO
WITHIN THE LAST YEAR, HAVE YOU BEEN KICKED, HIT, SLAPPED, OR OTHERWISE PHYSICALLY HURT BY YOUR PARTNER OR EX-PARTNER?: NO
IN A TYPICAL WEEK, HOW MANY TIMES DO YOU TALK ON THE PHONE WITH FAMILY, FRIENDS, OR NEIGHBORS?: MORE THAN THREE TIMES A WEEK
HOW OFTEN DO YOU ATTEND CHURCH OR RELIGIOUS SERVICES?: NEVER
WITHIN THE LAST YEAR, HAVE YOU BEEN HUMILIATED OR EMOTIONALLY ABUSED IN OTHER WAYS BY YOUR PARTNER OR EX-PARTNER?: NO
HOW OFTEN DO YOU ATTENT MEETINGS OF THE CLUB OR ORGANIZATION YOU BELONG TO?: NEVER
HOW HARD IS IT FOR YOU TO PAY FOR THE VERY BASICS LIKE FOOD, HOUSING, MEDICAL CARE, AND HEATING?: NOT HARD AT ALL
WITHIN THE LAST YEAR, HAVE YOU BEEN AFRAID OF YOUR PARTNER OR EX-PARTNER?: NO

## 2023-11-06 ASSESSMENT — COLUMBIA-SUICIDE SEVERITY RATING SCALE - C-SSRS
1. WITHIN THE PAST MONTH, HAVE YOU WISHED YOU WERE DEAD OR WISHED YOU COULD GO TO SLEEP AND NOT WAKE UP?: NO
6. HAVE YOU EVER DONE ANYTHING, STARTED TO DO ANYTHING, OR PREPARED TO DO ANYTHING TO END YOUR LIFE?: NO
2. HAVE YOU ACTUALLY HAD ANY THOUGHTS OF KILLING YOURSELF?: NO

## 2023-11-06 ASSESSMENT — LIFESTYLE VARIABLES
HOW OFTEN DO YOU HAVE A DRINK CONTAINING ALCOHOL: 2-4 TIMES A MONTH
HOW MANY STANDARD DRINKS CONTAINING ALCOHOL DO YOU HAVE ON A TYPICAL DAY: 1 OR 2

## 2023-11-12 PROBLEM — G89.29 CHRONIC MIDLINE LOW BACK PAIN WITHOUT SCIATICA: Status: ACTIVE | Noted: 2023-11-12

## 2023-11-12 PROBLEM — M54.50 CHRONIC MIDLINE LOW BACK PAIN WITHOUT SCIATICA: Status: ACTIVE | Noted: 2023-11-12

## 2023-11-12 PROBLEM — E66.3 OVERWEIGHT (BMI 25.0-29.9): Status: ACTIVE | Noted: 2023-11-12

## 2023-11-12 RX ORDER — IBUPROFEN 800 MG/1
800 TABLET ORAL EVERY 8 HOURS PRN
Qty: 120 TABLET | Refills: 0 | Status: SHIPPED | OUTPATIENT
Start: 2023-11-12

## 2023-11-12 RX ORDER — BUPROPION HYDROCHLORIDE 300 MG/1
300 TABLET ORAL DAILY
Qty: 30 TABLET | Refills: 11 | Status: SHIPPED | OUTPATIENT
Start: 2023-11-12

## 2023-11-12 RX ORDER — BUSPIRONE HYDROCHLORIDE 5 MG/1
5 TABLET ORAL 2 TIMES DAILY
Qty: 60 TABLET | Refills: 6 | Status: SHIPPED | OUTPATIENT
Start: 2023-11-12

## 2024-01-09 RX ORDER — UMECLIDINIUM BROMIDE AND VILANTEROL TRIFENATATE 62.5; 25 UG/1; UG/1
POWDER RESPIRATORY (INHALATION)
Qty: 1 EACH | Refills: 11 | Status: SHIPPED | OUTPATIENT
Start: 2024-01-09

## 2024-06-09 DIAGNOSIS — J43.1 PANLOBULAR EMPHYSEMA (HCC): ICD-10-CM

## 2024-06-11 RX ORDER — ALBUTEROL SULFATE 90 UG/1
AEROSOL, METERED RESPIRATORY (INHALATION)
Qty: 51 G | Refills: 2 | Status: SHIPPED | OUTPATIENT
Start: 2024-06-11

## 2024-09-12 DIAGNOSIS — I10 ESSENTIAL HYPERTENSION: ICD-10-CM

## 2024-09-12 DIAGNOSIS — E03.9 ACQUIRED HYPOTHYROIDISM: ICD-10-CM

## 2024-09-13 RX ORDER — LEVOTHYROXINE SODIUM 88 UG/1
88 TABLET ORAL DAILY
Qty: 100 TABLET | Refills: 3 | Status: SHIPPED | OUTPATIENT
Start: 2024-09-13

## 2024-09-13 RX ORDER — LISINOPRIL 10 MG/1
10 TABLET ORAL DAILY
Qty: 100 TABLET | Refills: 3 | Status: SHIPPED | OUTPATIENT
Start: 2024-09-13

## 2024-11-29 RX ORDER — UMECLIDINIUM BROMIDE AND VILANTEROL TRIFENATATE 62.5; 25 UG/1; UG/1
POWDER RESPIRATORY (INHALATION)
Qty: 180 EACH | Refills: 3 | OUTPATIENT
Start: 2024-11-29

## 2024-12-17 RX ORDER — UMECLIDINIUM BROMIDE AND VILANTEROL TRIFENATATE 62.5; 25 UG/1; UG/1
POWDER RESPIRATORY (INHALATION)
Qty: 180 EACH | Refills: 3 | OUTPATIENT
Start: 2024-12-17

## 2025-01-27 RX ORDER — UMECLIDINIUM BROMIDE AND VILANTEROL TRIFENATATE 62.5; 25 UG/1; UG/1
POWDER RESPIRATORY (INHALATION)
Qty: 120 EACH | Refills: 5 | Status: SHIPPED | OUTPATIENT
Start: 2025-01-27

## 2025-03-05 DIAGNOSIS — J43.1 PANLOBULAR EMPHYSEMA (HCC): ICD-10-CM

## 2025-03-06 RX ORDER — ALBUTEROL SULFATE 90 UG/1
INHALANT RESPIRATORY (INHALATION)
Qty: 51 G | Refills: 3 | Status: SHIPPED | OUTPATIENT
Start: 2025-03-06

## 2025-08-14 DIAGNOSIS — I10 ESSENTIAL HYPERTENSION: ICD-10-CM

## 2025-08-14 DIAGNOSIS — E03.9 ACQUIRED HYPOTHYROIDISM: ICD-10-CM

## 2025-08-17 RX ORDER — LISINOPRIL 10 MG/1
10 TABLET ORAL DAILY
Qty: 100 TABLET | Refills: 2 | Status: SHIPPED | OUTPATIENT
Start: 2025-08-17

## 2025-08-17 RX ORDER — LEVOTHYROXINE SODIUM 88 UG/1
88 TABLET ORAL DAILY
Qty: 100 TABLET | Refills: 2 | Status: SHIPPED | OUTPATIENT
Start: 2025-08-17